# Patient Record
Sex: MALE | Race: WHITE | NOT HISPANIC OR LATINO | Employment: OTHER | URBAN - METROPOLITAN AREA
[De-identification: names, ages, dates, MRNs, and addresses within clinical notes are randomized per-mention and may not be internally consistent; named-entity substitution may affect disease eponyms.]

---

## 2017-01-25 ENCOUNTER — OFFICE VISIT - HEALTHEAST (OUTPATIENT)
Dept: FAMILY MEDICINE | Facility: CLINIC | Age: 82
End: 2017-01-25

## 2017-01-25 DIAGNOSIS — I10 HYPERTENSION: ICD-10-CM

## 2017-01-25 ASSESSMENT — MIFFLIN-ST. JEOR: SCORE: 1347.65

## 2017-02-08 ENCOUNTER — AMBULATORY - HEALTHEAST (OUTPATIENT)
Dept: NURSING | Facility: CLINIC | Age: 82
End: 2017-02-08

## 2017-09-28 ENCOUNTER — AMBULATORY - HEALTHEAST (OUTPATIENT)
Dept: NURSING | Facility: CLINIC | Age: 82
End: 2017-09-28

## 2017-09-28 DIAGNOSIS — Z23 NEEDS FLU SHOT: ICD-10-CM

## 2017-11-01 ENCOUNTER — COMMUNICATION - HEALTHEAST (OUTPATIENT)
Dept: FAMILY MEDICINE | Facility: CLINIC | Age: 82
End: 2017-11-01

## 2017-11-01 DIAGNOSIS — I10 HYPERTENSION: ICD-10-CM

## 2017-12-27 ENCOUNTER — AMBULATORY - HEALTHEAST (OUTPATIENT)
Dept: NURSING | Facility: CLINIC | Age: 82
End: 2017-12-27

## 2018-01-23 ENCOUNTER — OFFICE VISIT - HEALTHEAST (OUTPATIENT)
Dept: FAMILY MEDICINE | Facility: CLINIC | Age: 83
End: 2018-01-23

## 2018-01-23 ENCOUNTER — COMMUNICATION - HEALTHEAST (OUTPATIENT)
Dept: FAMILY MEDICINE | Facility: CLINIC | Age: 83
End: 2018-01-23

## 2018-01-23 DIAGNOSIS — I10 HYPERTENSION: ICD-10-CM

## 2018-01-23 DIAGNOSIS — R42 LIGHTHEADEDNESS: ICD-10-CM

## 2018-01-23 DIAGNOSIS — R00.2 PALPITATIONS: ICD-10-CM

## 2018-01-23 LAB
ATRIAL RATE - MUSE: 97 BPM
DIASTOLIC BLOOD PRESSURE - MUSE: NORMAL MMHG
INTERPRETATION ECG - MUSE: NORMAL
P AXIS - MUSE: 14 DEGREES
PR INTERVAL - MUSE: 192 MS
QRS DURATION - MUSE: 76 MS
QT - MUSE: 330 MS
QTC - MUSE: 419 MS
R AXIS - MUSE: 57 DEGREES
SYSTOLIC BLOOD PRESSURE - MUSE: NORMAL MMHG
T AXIS - MUSE: 36 DEGREES
VENTRICULAR RATE- MUSE: 97 BPM

## 2018-01-23 ASSESSMENT — MIFFLIN-ST. JEOR: SCORE: 1355.42

## 2018-04-04 ENCOUNTER — COMMUNICATION - HEALTHEAST (OUTPATIENT)
Dept: FAMILY MEDICINE | Facility: CLINIC | Age: 83
End: 2018-04-04

## 2018-04-04 DIAGNOSIS — I10 HYPERTENSION: ICD-10-CM

## 2018-06-12 ENCOUNTER — AMBULATORY - HEALTHEAST (OUTPATIENT)
Dept: NURSING | Facility: CLINIC | Age: 83
End: 2018-06-12

## 2018-08-30 ENCOUNTER — OFFICE VISIT - HEALTHEAST (OUTPATIENT)
Dept: FAMILY MEDICINE | Facility: CLINIC | Age: 83
End: 2018-08-30

## 2018-08-30 DIAGNOSIS — J02.0 STREP PHARYNGITIS: ICD-10-CM

## 2018-08-30 LAB — DEPRECATED S PYO AG THROAT QL EIA: ABNORMAL

## 2018-08-30 ASSESSMENT — MIFFLIN-ST. JEOR: SCORE: 1369.48

## 2018-10-17 ENCOUNTER — AMBULATORY - HEALTHEAST (OUTPATIENT)
Dept: NURSING | Facility: CLINIC | Age: 83
End: 2018-10-17

## 2018-10-17 DIAGNOSIS — Z23 FLU VACCINE NEED: ICD-10-CM

## 2019-05-14 NOTE — PROGRESS NOTES
[place] [facility]  PRIMARY CARE PROVIDER AND CLINIC:  Clayton Huynh MD, XXX RESIGNED XXX [address] [facility] / [facility] [place] [address]  Chief Complaint   Patient presents with     Hospital F/U     [place] Medical Record Number:  [id number]  Place of Service where encounter took place:  [facility] ([facility]) [[id number]]    Gabriel Esposito  is a 89 year old  (9/8/1923), admitted to the above facility from  [facility]. Hospital stay 5/06/2019 through 5/14/2019..  Admitted to this facility for  rehab, medical management and nursing care.    HPI:    HPI information obtained from: facility chart records, facility staff, patient report and Care Everywhere Epic chart review.   Brief Summary of Hospital Course:   89-year-old male with past medical history of prostatitis hospitalized for E. coli sepsis/bacteremia/UTI.  Treated with vancomycin, meropenem, Rocephin, changed to Levaquin on May 11.  Hypotension and tachycardia improved with IV fluids.  ASHVIN with electrolyte imbalance.  Magnesium replaced orally.  Metabolic and lactic acidosis improved with bicarb drip.  ASHVIN improved.  Did experience volume overload due to resuscitation for sepsis.  CT revealed small bilateral pleural effusion   And lower extremity edema which responded to IV Lasix.  Did however develop blisters on both feet from the swelling.  Updates on Status Since Skilled nursing Admission:   Patient progressing well with therapies.  Pain is challenging, particularly with dressing changes.  Appetite good.  Continues to have loose stools.  Not odiferous.  Sleeping well.  Denies chest pain, shortness of breath, lightheadedness, dizziness    CODE STATUS/ADVANCE DIRECTIVES DISCUSSION:   CPR/Full code , DNI  Patient's living condition: lives with spouse  ALLERGIES: Patient has no known allergies.  PAST MEDICAL HISTORY:  has no past medical history on file.  PAST SURGICAL HISTORY:   has no past surgical history on file.  FAMILY  HISTORY: family history is not on file.  SOCIAL HISTORY:       Post Discharge Medication Reconciliation Status: discharge medications reconciled, continue medications without change    Current Outpatient Medications   Medication Sig Dispense Refill     Acetaminophen (TYLENOL PO) Take 1,000 mg by mouth 3 times daily And 500 mg by mouth 2 times a day as needed       aspirin 81 MG EC tablet Take 81 mg by mouth daily       brimonidine (ALPHAGAN-P) 0.15 % ophthalmic solution Place 1 drop into both eyes every morning       dorzolamide-timolol PF (COSOPT) 22.3-6.8 MG/ML opthalmic solution Place 1 drop into both eyes every morning       latanoprost (XALATAN) 0.005 % ophthalmic solution Place 1 drop into both eyes At Bedtime       loperamide (IMODIUM) 2 MG capsule Take 2 mg by mouth 3 times daily as needed for diarrhea       magnesium oxide 200 MG TABS Take 400 mg by mouth 2 times daily       menthol-zinc oxide (CALMOSEPTINE) 0.44-20.6 % OINT ointment Apply to Perianal irritation topically 3 times daily as needed         ROS:  10 point ROS of systems including Constitutional, Eyes, Respiratory, Cardiovascular, Gastroenterology, Genitourinary, Integumentary, Musculoskeletal, Psychiatric were all negative except for pertinent positives noted in my HPI.    Vitals:  /63   Pulse 80   Temp 98  F (36.7  C)   Resp 20   SpO2 100%   Exam:  GENERAL APPEARANCE:  Alert, in no distress, Resting in recliner chair  ENT:  Mouth and posterior oropharynx normal, moist mucous membranes, [place]  RESP:  lungs clear to auscultation , no respiratory distress, no adventitious sounds  CV:  Palpation and auscultation of heart done , regular rate and rhythm, no murmur, rub, or gallop, significant bilateral lower extremity edema.  Wrapped with Ace wraps  ABDOMEN:  no guarding or rebound, bowel sounds normal  M/S:   Gait and station abnormal due to edema and weakness, requires use of walker  SKIN: Ruptured blisters on dorsum of foot, ankle.  See  photos            : Gupta catheter intact, draining clear, felipa urine  NEURO:   NFD  PSYCH:  normal insight, judgement and memory, affect and mood normal    Lab/Diagnostic data:  Recent labs in [facility] reviewed by me today.     ASSESSMENT/PLAN:  Sepsis due to Escherichia coli (E. coli) (H)  Physical deconditioning  BPH retention  Resolving.  Continues on Levaquin 500 mg daily through May 16.  Gupta catheter to remain in until urology visit.  Staff is scheduling visit for 7 to 10 days.  Continues to have some loose stools.  C. difficile test negative.  PRN Imodium managing.  Continue  Continue with therapies for strengthening and functionality    Localized edema  Due to volume overload.   Lymphedema therapy and wraps.  Resolving    Blisters of multiple sites  Presumed due to significant swelling from IV resuscitation per sepsis.  Resolving.  No signs or symptoms of infection or complications.  Continues with  Tylenol 500 mg 3 times daily   However this is not sufficient for pain management.  Will increase Tylenol closer to max  Wound care daily with Adaptic, ABD, Kerlix.    Chronic bilateral low back pain without sciatica  Chronic condition.  Does not interfere with therapy.  Managed with Tylenol    Hypomagnesemia  Acute kidney injury with electrolyte imbalance in hospital.  On magnesium oxide 400 mg twice daily for 3 days through May 17.  Will recheck potassium to assure stability.       transcribed by : Antonette Maldonado  Orders:  1. Increase Tylenol to 1000 mg TID and 500 mg BID PRN - Dx: Pain  2. Labs on Friday 5/17: BMP (fluid overload, diarrhea), Mag (low mag)    Total time spent with patient visit at the skilled nursing facility was 45 min including patient visit and review of past records. Greater than 50% of total time spent with counseling and coordinating care due to Admission  Electronically signed by:  GERA Whaley CNP

## 2019-05-15 ENCOUNTER — NURSING HOME VISIT (OUTPATIENT)
Dept: GERIATRICS | Facility: CLINIC | Age: 84
End: 2019-05-15
Payer: COMMERCIAL

## 2019-05-15 VITALS
OXYGEN SATURATION: 100 % | TEMPERATURE: 98 F | SYSTOLIC BLOOD PRESSURE: 125 MMHG | HEART RATE: 80 BPM | RESPIRATION RATE: 20 BRPM | DIASTOLIC BLOOD PRESSURE: 63 MMHG

## 2019-05-15 DIAGNOSIS — R60.0 LOCALIZED EDEMA: ICD-10-CM

## 2019-05-15 DIAGNOSIS — R53.81 PHYSICAL DECONDITIONING: ICD-10-CM

## 2019-05-15 DIAGNOSIS — M54.50 CHRONIC BILATERAL LOW BACK PAIN WITHOUT SCIATICA: ICD-10-CM

## 2019-05-15 DIAGNOSIS — E83.42 HYPOMAGNESEMIA: ICD-10-CM

## 2019-05-15 DIAGNOSIS — R23.8 BLISTERS OF MULTIPLE SITES: ICD-10-CM

## 2019-05-15 DIAGNOSIS — G89.29 CHRONIC BILATERAL LOW BACK PAIN WITHOUT SCIATICA: ICD-10-CM

## 2019-05-15 DIAGNOSIS — A41.51 SEPSIS DUE TO ESCHERICHIA COLI (E. COLI) (H): Primary | ICD-10-CM

## 2019-05-15 PROCEDURE — 99310 SBSQ NF CARE HIGH MDM 45: CPT | Performed by: NURSE PRACTITIONER

## 2019-05-15 RX ORDER — LOPERAMIDE HCL 2 MG
2 CAPSULE ORAL 3 TIMES DAILY PRN
COMMUNITY

## 2019-05-15 RX ORDER — BRIMONIDINE TARTRATE 1.5 MG/ML
1 SOLUTION/ DROPS OPHTHALMIC EVERY MORNING
COMMUNITY

## 2019-05-15 RX ORDER — LEVOFLOXACIN 500 MG/1
500 TABLET, FILM COATED ORAL DAILY
COMMUNITY
Start: 2019-05-15 | End: 2019-05-21

## 2019-05-15 RX ORDER — ASPIRIN 81 MG/1
81 TABLET ORAL DAILY
COMMUNITY
End: 2022-04-20

## 2019-05-15 RX ORDER — LATANOPROST 50 UG/ML
1 SOLUTION/ DROPS OPHTHALMIC AT BEDTIME
COMMUNITY

## 2019-05-15 NOTE — LETTER
5/15/2019        RE: Gabriel Esposito  [address] [facility] / [facility] [place] [address]        [place] [facility]  PRIMARY CARE PROVIDER AND CLINIC:  Clayton Huynh MD, XXX RESIGNED XXX [address] [facility] / [place] [address]  Chief Complaint   Patient presents with     Hospital F/U     [place] Medical Record Number:  [id number]  Place of Service where encounter took place:  [facility] ([facility]) [[id number]]    Gabriel Esposito  is a 89 year old  (9/8/1923), admitted to the above facility from  [facility]. Hospital stay 5/06/2019 through 5/14/2019..  Admitted to this facility for  rehab, medical management and nursing care.    HPI:    HPI information obtained from: facility chart records, facility staff, patient report and Care Everywhere Epic chart review.   Brief Summary of Hospital Course:   89-year-old male with past medical history of prostatitis hospitalized for E. coli sepsis/bacteremia/UTI.  Treated with vancomycin, meropenem, Rocephin, changed to Levaquin on May 11.  Hypotension and tachycardia improved with IV fluids.  ASHVIN with electrolyte imbalance.  Magnesium replaced orally.  Metabolic and lactic acidosis improved with bicarb drip.  ASHVIN improved.  Did experience volume overload due to resuscitation for sepsis.  CT revealed small bilateral pleural effusion   And lower extremity edema which responded to IV Lasix.  Did however develop blisters on both feet from the swelling.  Updates on Status Since Skilled nursing Admission:   Patient progressing well with therapies.  Pain is challenging, particularly with dressing changes.  Appetite good.  Continues to have loose stools.  Not odiferous.  Sleeping well.  Denies chest pain, shortness of breath, lightheadedness, dizziness    CODE STATUS/ADVANCE DIRECTIVES DISCUSSION:   CPR/Full code , DNI  Patient's living condition: lives with spouse  ALLERGIES: Patient has no known allergies.  PAST MEDICAL HISTORY:  has no past medical  history on file.  PAST SURGICAL HISTORY:   has no past surgical history on file.  FAMILY HISTORY: family history is not on file.  SOCIAL HISTORY:       Post Discharge Medication Reconciliation Status: discharge medications reconciled, continue medications without change    Current Outpatient Medications   Medication Sig Dispense Refill     Acetaminophen (TYLENOL PO) Take 1,000 mg by mouth 3 times daily And 500 mg by mouth 2 times a day as needed       aspirin 81 MG EC tablet Take 81 mg by mouth daily       brimonidine (ALPHAGAN-P) 0.15 % ophthalmic solution Place 1 drop into both eyes every morning       dorzolamide-timolol PF (COSOPT) 22.3-6.8 MG/ML opthalmic solution Place 1 drop into both eyes every morning       latanoprost (XALATAN) 0.005 % ophthalmic solution Place 1 drop into both eyes At Bedtime       loperamide (IMODIUM) 2 MG capsule Take 2 mg by mouth 3 times daily as needed for diarrhea       magnesium oxide 200 MG TABS Take 400 mg by mouth 2 times daily       menthol-zinc oxide (CALMOSEPTINE) 0.44-20.6 % OINT ointment Apply to Perianal irritation topically 3 times daily as needed         ROS:  10 point ROS of systems including Constitutional, Eyes, Respiratory, Cardiovascular, Gastroenterology, Genitourinary, Integumentary, Musculoskeletal, Psychiatric were all negative except for pertinent positives noted in my HPI.    Vitals:  /63   Pulse 80   Temp 98  F (36.7  C)   Resp 20   SpO2 100%   Exam:  GENERAL APPEARANCE:  Alert, in no distress, Resting in recliner chair  ENT:  Mouth and posterior oropharynx normal, moist mucous membranes, [place]  RESP:  lungs clear to auscultation , no respiratory distress, no adventitious sounds  CV:  Palpation and auscultation of heart done , regular rate and rhythm, no murmur, rub, or gallop, significant bilateral lower extremity edema.  Wrapped with Ace wraps  ABDOMEN:  no guarding or rebound, bowel sounds normal  M/S:   Gait and station abnormal due to edema and  weakness, requires use of walker  SKIN: Ruptured blisters on dorsum of foot, ankle.  See photos            : Gupta catheter intact, draining clear, felipa urine  NEURO:   NFD  PSYCH:  normal insight, judgement and memory, affect and mood normal    Lab/Diagnostic data:  Recent labs in [facility] reviewed by me today.     ASSESSMENT/PLAN:  Sepsis due to Escherichia coli (E. coli) (H)  Physical deconditioning  BPH retention  Resolving.  Continues on Levaquin 500 mg daily through May 16.  Gupta catheter to remain in until urology visit.  Staff is scheduling visit for 7 to 10 days.  Continues to have some loose stools.  C. difficile test negative.  PRN Imodium managing.  Continue  Continue with therapies for strengthening and functionality    Localized edema  Due to volume overload.   Lymphedema therapy and wraps.  Resolving    Blisters of multiple sites  Presumed due to significant swelling from IV resuscitation per sepsis.  Resolving.  No signs or symptoms of infection or complications.  Continues with  Tylenol 500 mg 3 times daily   However this is not sufficient for pain management.  Will increase Tylenol closer to max  Wound care daily with KOURTNEY Bautista Kerlix.    Chronic bilateral low back pain without sciatica  Chronic condition.  Does not interfere with therapy.  Managed with Tylenol    Hypomagnesemia  Acute kidney injury with electrolyte imbalance in hospital.  On magnesium oxide 400 mg twice daily for 3 days through May 17.  Will recheck potassium to assure stability.       transcribed by : Antonette Maldonado  Orders:  1. Increase Tylenol to 1000 mg TID and 500 mg BID PRN - Dx: Pain  2. Labs on Friday 5/17: BMP (fluid overload, diarrhea), Mag (low mag)    Total time spent with patient visit at the skilled nursing facility was 45 min including patient visit and review of past records. Greater than 50% of total time spent with counseling and coordinating care due to Admission  Electronically signed  by:  GERA Whaley CNP                       Sincerely,        GERA Whaley CNP

## 2019-05-16 ENCOUNTER — HOSPITAL LABORATORY (OUTPATIENT)
Facility: OTHER | Age: 84
End: 2019-05-16

## 2019-05-16 LAB
ANION GAP SERPL CALCULATED.3IONS-SCNC: 5 MMOL/L (ref 3–14)
BUN SERPL-MCNC: 20 MG/DL (ref 7–30)
CALCIUM SERPL-MCNC: 8.3 MG/DL (ref 8.5–10.1)
CHLORIDE SERPL-SCNC: 109 MMOL/L (ref 94–109)
CO2 SERPL-SCNC: 27 MMOL/L (ref 20–32)
CREAT SERPL-MCNC: 1.01 MG/DL (ref 0.66–1.25)
ERYTHROCYTE [DISTWIDTH] IN BLOOD BY AUTOMATED COUNT: 13.5 % (ref 10–15)
GFR SERPL CREATININE-BSD FRML MDRD: 63 ML/MIN/{1.73_M2}
GLUCOSE SERPL-MCNC: 114 MG/DL (ref 70–99)
HCT VFR BLD AUTO: 36 % (ref 40–53)
HGB BLD-MCNC: 11.3 G/DL (ref 13.3–17.7)
MAGNESIUM SERPL-MCNC: 2.4 MG/DL (ref 1.6–2.3)
MCH RBC QN AUTO: 30.1 PG (ref 26.5–33)
MCHC RBC AUTO-ENTMCNC: 31.4 G/DL (ref 31.5–36.5)
MCV RBC AUTO: 96 FL (ref 78–100)
PLATELET # BLD AUTO: 325 10E9/L (ref 150–450)
POTASSIUM SERPL-SCNC: 4.1 MMOL/L (ref 3.4–5.3)
RBC # BLD AUTO: 3.76 10E12/L (ref 4.4–5.9)
SODIUM SERPL-SCNC: 141 MMOL/L (ref 133–144)
WBC # BLD AUTO: 13.1 10E9/L (ref 4–11)

## 2019-05-20 ENCOUNTER — HOSPITAL LABORATORY (OUTPATIENT)
Facility: OTHER | Age: 84
End: 2019-05-20

## 2019-05-20 VITALS
TEMPERATURE: 97.9 F | SYSTOLIC BLOOD PRESSURE: 152 MMHG | OXYGEN SATURATION: 98 % | WEIGHT: 146.5 LBS | HEIGHT: 71 IN | HEART RATE: 80 BPM | BODY MASS INDEX: 20.51 KG/M2 | RESPIRATION RATE: 16 BRPM | DIASTOLIC BLOOD PRESSURE: 63 MMHG

## 2019-05-20 LAB
ERYTHROCYTE [DISTWIDTH] IN BLOOD BY AUTOMATED COUNT: 13.5 % (ref 10–15)
HCT VFR BLD AUTO: 37.4 % (ref 40–53)
HGB BLD-MCNC: 11.7 G/DL (ref 13.3–17.7)
MAGNESIUM SERPL-MCNC: 2.6 MG/DL (ref 1.6–2.3)
MCH RBC QN AUTO: 30.3 PG (ref 26.5–33)
MCHC RBC AUTO-ENTMCNC: 31.3 G/DL (ref 31.5–36.5)
MCV RBC AUTO: 97 FL (ref 78–100)
PLATELET # BLD AUTO: 372 10E9/L (ref 150–450)
RBC # BLD AUTO: 3.86 10E12/L (ref 4.4–5.9)
WBC # BLD AUTO: 8.1 10E9/L (ref 4–11)

## 2019-05-20 RX ORDER — MULTIPLE VITAMINS W/ MINERALS TAB 9MG-400MCG
1 TAB ORAL DAILY
COMMUNITY
End: 2022-04-20

## 2019-05-20 ASSESSMENT — MIFFLIN-ST. JEOR: SCORE: 1313.71

## 2019-05-21 ENCOUNTER — NURSING HOME VISIT (OUTPATIENT)
Dept: GERIATRICS | Facility: CLINIC | Age: 84
End: 2019-05-21
Payer: COMMERCIAL

## 2019-05-21 DIAGNOSIS — A41.51 SEPSIS DUE TO ESCHERICHIA COLI (E. COLI) (H): Primary | ICD-10-CM

## 2019-05-21 DIAGNOSIS — M54.50 CHRONIC BILATERAL LOW BACK PAIN WITHOUT SCIATICA: ICD-10-CM

## 2019-05-21 DIAGNOSIS — R23.8 BLISTERS OF MULTIPLE SITES: ICD-10-CM

## 2019-05-21 DIAGNOSIS — R53.81 PHYSICAL DECONDITIONING: ICD-10-CM

## 2019-05-21 DIAGNOSIS — G89.29 CHRONIC BILATERAL LOW BACK PAIN WITHOUT SCIATICA: ICD-10-CM

## 2019-05-21 PROCEDURE — 99306 1ST NF CARE HIGH MDM 50: CPT | Performed by: INTERNAL MEDICINE

## 2019-05-21 NOTE — PROGRESS NOTES
[place] [facility]  INITIAL VISIT NOTE  May 21, 2019    PRIMARY CARE PROVIDER AND CLINIC:  Hector Martin [facility] [address] [place] [address] / [facility] [address]    Chief Complaint   Patient presents with     Hospital F/U       HPI:    Gabriel Esposito is a 89 year old  (9/8/1923) male who was seen at [facility] on May 21, 2019 for an initial visit. Medical history is notable for HTN not on any medications. He was hospitalized at [facility] from 5/6/19 to 5/14/19 where he presented with abdominal pain and was found to have sepsis secondary to E Coli. Both blood and urine cultures were positive. Imaging with prostate hypertrophy hypodensities for which urology recommended outpatient follow up. A Gupta catheter was placed on 5/7/19 due to urinary retention with recommendation for trial of void at urology follow up. Hospital course complicated by iatrogenic volume overload for which he was diuresed. He developed blisters on both feet in setting of edema from volume resuscitation. He was admitted to this facility for medical management and rehab.     Today, Mr. Esposito is seen in his room. Overall is doing OK. Was able to give me a decent history of his recent hospitalization. Describes significant abdominal pain prior to going into the hospital, none now. He has a Gupta catheter in place. It does not bother him. No diarrhea or constipation. No chest pain or dyspnea. Notes his feet are getting better, but the blister on the R ankle is still painful.  He is planning to discharge to home with his spouse on Friday. Is going to change to a larger leg bag for the Gupta. Discussed with nursing and no concerns today. Working with therapies.     CODE STATUS:   CPR/Full code     ALLERGIES:   No Known Allergies    PAST MEDICAL HISTORY:   HTN, HLD, GERD    PAST SURGICAL HISTORY:   No past surgical history on file.    FAMILY HISTORY:   Reviewed and non-contributory    SOCIAL HISTORY:   Lives with spouse  "    MEDICATIONS:  Current Outpatient Medications   Medication Sig Dispense Refill     Acetaminophen (TYLENOL PO) Take 1,000 mg by mouth 3 times daily And 500 mg by mouth 2 times a day as needed       aspirin 81 MG EC tablet Take 81 mg by mouth daily       brimonidine (ALPHAGAN-P) 0.15 % ophthalmic solution Place 1 drop into both eyes every morning       dorzolamide-timolol PF (COSOPT) 22.3-6.8 MG/ML opthalmic solution Place 1 drop into both eyes every morning       latanoprost (XALATAN) 0.005 % ophthalmic solution Place 1 drop into both eyes At Bedtime       loperamide (IMODIUM) 2 MG capsule Take 2 mg by mouth 3 times daily as needed for diarrhea       menthol-zinc oxide (CALMOSEPTINE) 0.44-20.6 % OINT ointment Apply to Perianal irritation topically 3 times daily as needed       multivitamin w/minerals (MULTI-VITAMIN) tablet Take 1 tablet by mouth daily           ROS:  10 point ROS neg other than the symptoms noted above in the HPI.    PHYSICAL EXAM:  /63   Pulse 80   Temp 97.9  F (36.6  C)   Resp 16   Ht 1.791 m (5' 10.5\")   Wt 66.5 kg (146 lb 8 oz)   SpO2 98%   BMI 20.72 kg/m     Gen: sitting in recliner, alert, cooperative and in no acute distress  HEENT: normocephalic; oropharynx clear  Card: RRR, S1, S2, no murmurs  Resp: lungs clear to auscultation bilaterally, no crackles or wheezes  GI: abdomen soft, not-tender  : Gupta in place draining clear yellow urine  MSK: normal muscle tone, no LE edema  Neuro: CX II-XII grossly in tact; ROM in all four extremities grossly in tact  Psych: alert and oriented x3; normal affect  Skin: R foot with healing area from deroofed blisters on the dorsum of both feet foot and medial aspect of both ankles    LABORATORY/IMAGING DATA:  Reviewed as per Epic    ASSESSMENT/PLAN:    E Coli Sepsis  Secondary to urinary source. Blood and urine cultures positive. Completed a course of antibiotics on 5/16/19. Afebrile at TCU. Gupta in place.   -- follow clinically    Urinary " Retention  Gupta placed on 5/7/19 with plan for trial of void at urology follow up.   -- routine Gupta cares  -- follow up with urology as scheduled on 6/11/19  -- will have home RN upon discharge    Prostate Hypertrophy & Hypodensities  Seen on imaging. Urology recommended outpatient follow up for possible prostate ultrasound.   -- follow up with urology as scheduled on 6/11/19    Bilateral LE Blisters  Iatrogenic from edema from volume resuscitation in setting of sepsis. No signs of infection. They appear to be healing.   -- wound cares as ordered: cleanse with saline and pat dry. Apply adaptic to open blisters  -- continues on APAP 1000 mg TID  -- will have home RN upon discharge    Chronic Low Back Pain  Not bothersome today  -- continues on APAP 1000 mg TID    HTN  By history. Not on any anti-HTN medications. SBPs labile in 120s-160s, most 120s-130s.  -- follow clinically     Physical Deconditioning  In setting of hospitalization and underlying medical conditions  -- ongoing PT/OT      FGS TIME SPENT: Total time spent with patient visit at the skilled nursing facility was >45 min including patient visit, review of past records and discussion with nursing and NP. Greater than 50% of total time spent with counseling and coordinating care due to recent hosptialization for sepsis complicated by iatrogenic volume overload with LE blisters requiring wound cares, urinary retention with Gupta in place, discharge planning    Electronically signed by:  Rena Santamaria MD        Documentation of Face to Face and Certification for Home Health Services    I certify that patientGabriel is under my care and that I, or a Nurse Practitioner or Physician's Assistant working with me, had a face-to-face encounter that meets the physician face-to-face encounter requirements with this patient on: 5/21/2019.    This encounter with the patient was in whole, or in part, for the following medical condition, which is the primary  reason for Home Health Care: E Coli Sepsis, bilateral LE blisters with ongoing wound cares, urinary retention with Gupta catheter in place, physical deconditioning     I certify that, based on my findings, the following services are medically necessary Home Health Services: Nursing, Occupational Therapy, Physical Therapy and HHA    My clinical findings support the need for the above services because: Nurse is needed: wound cares, Gupta teaching, cares., Occupational Therapy Services are needed to assess and treat cognitive ability and address ADL safety due to impairment in ongoing strength, balance, endurance, ADLS and Physical Therapy Services are needed to assess and treat the following functional impairments: ongoing strength, balance, endurance.    Further, I certify that my clinical findings support that this patient is homebound (i.e. absences from home require considerable and taxing effort and are for medical reasons or [employer] services or infrequently or of short duration when for other reasons) because: unable to leave home without assistance    Based on the above findings, I certify that this patient is confined to the home and needs intermittent skilled nursing care, physical therapy and/or speech therapy.  The patient is under my care, and I have initiated the establishment of the plan of care.  This patient will be followed by a physician who will periodically review the plan of care.    Physician/Provider to provide follow up care: Hector Martin    [facility] certified Physician at time of discharge: Rena Santamaria MD  Electronically Signed by: Rena Santamaria MD

## 2019-05-21 NOTE — LETTER
5/21/2019        RE: Gabriel Esposito  [address] [facility] [address]        [facility]  INITIAL VISIT NOTE  May 21, 2019    PRIMARY CARE PROVIDER AND CLINIC:  [facility] [address] [facility] [address] / [facility] [address]    Chief Complaint   Patient presents with     Hospital F/U       HPI:    Gabriel Esposito is a 89 year old  (9/8/1923) male who was seen at [facility] [place] [facility] on May 21, 2019 for an initial visit. Medical history is notable for HTN not on any medications. He was hospitalized at [facility] from 5/6/19 to 5/14/19 where he presented with abdominal pain and was found to have sepsis secondary to E Coli. Both blood and urine cultures were positive. Imaging with prostate hypertrophy hypodensities for which urology recommended outpatient follow up. A Gupta catheter was placed on 5/7/19 due to urinary retention with recommendation for trial of void at urology follow up. Hospital course complicated by iatrogenic volume overload for which he was diuresed. He developed blisters on both feet in setting of edema from volume resuscitation. He was admitted to this facility for medical management and rehab.     Today, Mr. Esposito is seen in his room. Overall is doing OK. Was able to give me a decent history of his recent hospitalization. Describes significant abdominal pain prior to going into the hospital, none now. He has a Gupta catheter in place. It does not bother him. No diarrhea or constipation. No chest pain or dyspnea. Notes his feet are getting better, but the blister on the R ankle is still painful.  He is planning to discharge to home with his spouse on Friday. Is going to change to a larger leg bag for the Gupta. Discussed with nursing and no concerns today. Working with therapies.     CODE STATUS:   CPR/Full code     ALLERGIES:   No Known Allergies    PAST MEDICAL HISTORY:   HTN, HLD, GERD    PAST SURGICAL HISTORY:   No past surgical history on  "file.    FAMILY HISTORY:   Reviewed and non-contributory    SOCIAL HISTORY:   Lives with spouse     MEDICATIONS:  Current Outpatient Medications   Medication Sig Dispense Refill     Acetaminophen (TYLENOL PO) Take 1,000 mg by mouth 3 times daily And 500 mg by mouth 2 times a day as needed       aspirin 81 MG EC tablet Take 81 mg by mouth daily       brimonidine (ALPHAGAN-P) 0.15 % ophthalmic solution Place 1 drop into both eyes every morning       dorzolamide-timolol PF (COSOPT) 22.3-6.8 MG/ML opthalmic solution Place 1 drop into both eyes every morning       latanoprost (XALATAN) 0.005 % ophthalmic solution Place 1 drop into both eyes At Bedtime       loperamide (IMODIUM) 2 MG capsule Take 2 mg by mouth 3 times daily as needed for diarrhea       menthol-zinc oxide (CALMOSEPTINE) 0.44-20.6 % OINT ointment Apply to Perianal irritation topically 3 times daily as needed       multivitamin w/minerals (MULTI-VITAMIN) tablet Take 1 tablet by mouth daily           ROS:  10 point ROS neg other than the symptoms noted above in the HPI.    PHYSICAL EXAM:  /63   Pulse 80   Temp 97.9  F (36.6  C)   Resp 16   Ht 1.791 m (5' 10.5\")   Wt 66.5 kg (146 lb 8 oz)   SpO2 98%   BMI 20.72 kg/m      Gen: sitting in recliner, alert, cooperative and in no acute distress  HEENT: normocephalic; oropharynx clear  Card: RRR, S1, S2, no murmurs  Resp: lungs clear to auscultation bilaterally, no crackles or wheezes  GI: abdomen soft, not-tender  : Gupta in place draining clear yellow urine  MSK: normal muscle tone, no LE edema  Neuro: CX II-XII grossly in tact; ROM in all four extremities grossly in tact  Psych: alert and oriented x3; normal affect  Skin: R foot with healing area from deroofed blisters on the dorsum of both feet foot and medial aspect of both ankles    LABORATORY/IMAGING DATA:  Reviewed as per Epic    ASSESSMENT/PLAN:    E Coli Sepsis  Secondary to urinary source. Blood and urine cultures positive. Completed a " course of antibiotics on 5/16/19. Afebrile at TCU. Gupta in place.   -- follow clinically    Urinary Retention  Gupta placed on 5/7/19 with plan for trial of void at urology follow up.   -- routine Gupta cares  -- follow up with urology as scheduled on 6/11/19  -- will have home RN upon discharge    Prostate Hypertrophy & Hypodensities  Seen on imaging. Urology recommended outpatient follow up for possible prostate ultrasound.   -- follow up with urology as scheduled on 6/11/19    Bilateral LE Blisters  Iatrogenic from edema from volume resuscitation in setting of sepsis. No signs of infection. They appear to be healing.   -- wound cares as ordered: cleanse with saline and pat dry. Apply adaptic to open blisters  -- continues on APAP 1000 mg TID  -- will have home RN upon discharge    Chronic Low Back Pain  Not bothersome today  -- continues on APAP 1000 mg TID    HTN  By history. Not on any anti-HTN medications. SBPs labile in 120s-160s, most 120s-130s.  -- follow clinically     Physical Deconditioning  In setting of hospitalization and underlying medical conditions  -- ongoing PT/OT      FGS TIME SPENT: Total time spent with patient visit at the skilled nursing facility was >45 min including patient visit, review of past records and discussion with nursing and NP. Greater than 50% of total time spent with counseling and coordinating care due to recent hosptialization for sepsis complicated by iatrogenic volume overload with LE blisters requiring wound cares, urinary retention with Gupta in place, discharge planning    Electronically signed by:  Rena Santamaria MD        Documentation of Face to Face and Certification for Home Health Services    I certify that patientGabriel is under my care and that I, or a Nurse Practitioner or Physician's Assistant working with me, had a face-to-face encounter that meets the physician face-to-face encounter requirements with this patient on: 5/21/2019.    This encounter  with the patient was in whole, or in part, for the following medical condition, which is the primary reason for Home Health Care: E Coli Sepsis, bilateral LE blisters with ongoing wound cares, urinary retention with Gupta catheter in place, physical deconditioning     I certify that, based on my findings, the following services are medically necessary Home Health Services: Nursing, Occupational Therapy, Physical Therapy and HHA    My clinical findings support the need for the above services because: Nurse is needed: wound cares, Gupta teaching, cares., Occupational Therapy Services are needed to assess and treat cognitive ability and address ADL safety due to impairment in ongoing strength, balance, endurance, ADLS and Physical Therapy Services are needed to assess and treat the following functional impairments: ongoing strength, balance, endurance.    Further, I certify that my clinical findings support that this patient is homebound (i.e. absences from home require considerable and taxing effort and are for medical reasons or [employer] services or infrequently or of short duration when for other reasons) because: unable to leave home without assistance    Based on the above findings, I certify that this patient is confined to the home and needs intermittent skilled nursing care, physical therapy and/or speech therapy.  The patient is under my care, and I have initiated the establishment of the plan of care.  This patient will be followed by a physician who will periodically review the plan of care.    Physician/Provider to provide follow up care: Hector Martin certified Physician at time of discharge: Rena Santamaria MD  Electronically Signed by: Rena Santamaria MD                  Sincerely,        Rena Santamaria MD

## 2019-06-04 ENCOUNTER — RECORDS - HEALTHEAST (OUTPATIENT)
Dept: ADMINISTRATIVE | Facility: OTHER | Age: 84
End: 2019-06-04

## 2019-06-05 ENCOUNTER — RECORDS - HEALTHEAST (OUTPATIENT)
Dept: ADMINISTRATIVE | Facility: OTHER | Age: 84
End: 2019-06-05

## 2019-06-10 ENCOUNTER — TELEPHONE (OUTPATIENT)
Dept: UROLOGY | Facility: CLINIC | Age: 84
End: 2019-06-10

## 2019-06-10 NOTE — TELEPHONE ENCOUNTER
Advised we have no Urologist in clinic until that time.  offered could consult with their PCP care system to find another Urologist within their care system if wished.    Pt opted to stay with the current appt.  Arianna Menjivar RN  [facility] - [place], [place]

## 2019-06-10 NOTE — TELEPHONE ENCOUNTER
Reason for Call:  Other     Detailed comments: Pt's wife, Anali, calling (no consent to communicate on file): Pt was scheduled for catheter removal on 06/11 - rescheduled to 06/19. Pt has had catheter in since 05/05. Pt's wife is concerned at how long the catheter will be in - Please advise    Phone Number Patient can be reached at: Home number on file [phone] (home)    Best Time: Any    Can we leave a detailed message on this number? YES    Call taken on 6/10/2019 at 11:30 AM by Denise Behrendt

## 2019-06-12 NOTE — TELEPHONE ENCOUNTER
Called ad advised Dr Metcalf is booked further out than Dr Billy and no earlier appts are available.  Pt states she does have family and stated they will drive him to wherever he needs to go.  Reviewed Urology offices available on  website.  Pt opted [facility], number given for that office.    Arianna Menjivar RN  [facility] [place]

## 2019-06-12 NOTE — TELEPHONE ENCOUNTER
Pt wife calling stating she would like to talk to the RN again. She states she spoke to Elisha on [place] and they suggested pt try to get in to see Dr. Metcalf if Dr. Billy is out of the office.   She is very concerned about pt having cath in for another couple weeks.     Please call    Laury Guerra  Specialty CSS

## 2019-06-19 ENCOUNTER — OFFICE VISIT (OUTPATIENT)
Dept: UROLOGY | Facility: CLINIC | Age: 84
End: 2019-06-19
Payer: COMMERCIAL

## 2019-06-19 VITALS — DIASTOLIC BLOOD PRESSURE: 71 MMHG | SYSTOLIC BLOOD PRESSURE: 164 MMHG | HEART RATE: 66 BPM | RESPIRATION RATE: 16 BRPM

## 2019-06-19 DIAGNOSIS — R33.9 URINARY RETENTION: Primary | ICD-10-CM

## 2019-06-19 PROCEDURE — 51702 INSERT TEMP BLADDER CATH: CPT | Performed by: UROLOGY

## 2019-06-19 NOTE — NURSING NOTE
"Initial /71 (BP Location: Right arm, Patient Position: Chair, Cuff Size: Adult Regular)   Pulse 66   Resp 16  Estimated body mass index is 20.72 kg/m  as calculated from the following:    Height as of 5/20/19: 1.791 m (5' 10.5\").    Weight as of 5/20/19: 66.5 kg (146 lb 8 oz). .    Possible  TRIAL OF VOID.  cristino dey LPN    "

## 2019-06-19 NOTE — NURSING NOTE
Gabriel Esposito was brought back to the procedure room for a trail void per Dr. billy. 150 cc of sterile water was instilled into his bladder. 10 cc fluid was removed from his catheter balloon and a 16 Liberian golden catheter was removed. The patient was unable to urinate.  Per Dr Billy patient was instructed how to do cic.Patient taught to self catheterize per Dr. billy. Patient demonstrated the procedure well. Patient was taught with a 14 fr. Catheter. Supplies given to the patient.cristino dey LPN

## 2019-07-15 NOTE — PROGRESS NOTES
Gabreil Esposito was brought back to the procedure room for a trail void per Dr. marcial. 150 cc of sterile water was instilled into his bladder. 10 cc fluid was removed from his catheter balloon and a 16 [place]uran golden catheter was removed. The patient was unable to urinate.  Per Dr Marcial patient was instructed how to do cic.Patient taught to self catheterize per Dr. marcial. Patient demonstrated the procedure well. Patient was taught with a 14 fr. Catheter. Supplies given to the patient.

## 2019-07-18 ENCOUNTER — RECORDS - HEALTHEAST (OUTPATIENT)
Dept: ADMINISTRATIVE | Facility: OTHER | Age: 84
End: 2019-07-18

## 2019-07-22 ENCOUNTER — COMMUNICATION - HEALTHEAST (OUTPATIENT)
Dept: FAMILY MEDICINE | Facility: CLINIC | Age: 84
End: 2019-07-22

## 2019-07-26 ENCOUNTER — OFFICE VISIT - HEALTHEAST (OUTPATIENT)
Dept: FAMILY MEDICINE | Facility: CLINIC | Age: 84
End: 2019-07-26

## 2019-07-26 DIAGNOSIS — E78.49 OTHER HYPERLIPIDEMIA: ICD-10-CM

## 2019-07-26 DIAGNOSIS — H40.9 GLAUCOMA, UNSPECIFIED GLAUCOMA TYPE, UNSPECIFIED LATERALITY: ICD-10-CM

## 2019-07-26 DIAGNOSIS — I10 ESSENTIAL HYPERTENSION: ICD-10-CM

## 2019-09-26 ENCOUNTER — AMBULATORY - HEALTHEAST (OUTPATIENT)
Dept: NURSING | Facility: CLINIC | Age: 84
End: 2019-09-26

## 2020-08-14 ENCOUNTER — RECORDS - HEALTHEAST (OUTPATIENT)
Dept: ADMINISTRATIVE | Facility: OTHER | Age: 85
End: 2020-08-14

## 2020-09-09 ENCOUNTER — OFFICE VISIT - HEALTHEAST (OUTPATIENT)
Dept: FAMILY MEDICINE | Facility: CLINIC | Age: 85
End: 2020-09-09

## 2020-09-09 DIAGNOSIS — T83.511A URINARY TRACT INFECTION ASSOCIATED WITH CATHETERIZATION OF URINARY TRACT, UNSPECIFIED INDWELLING URINARY CATHETER TYPE, INITIAL ENCOUNTER (H): ICD-10-CM

## 2020-09-09 DIAGNOSIS — N39.0 URINARY TRACT INFECTION ASSOCIATED WITH CATHETERIZATION OF URINARY TRACT, UNSPECIFIED INDWELLING URINARY CATHETER TYPE, INITIAL ENCOUNTER (H): ICD-10-CM

## 2020-09-09 LAB
ALBUMIN UR-MCNC: ABNORMAL MG/DL
APPEARANCE UR: ABNORMAL
BACTERIA #/AREA URNS HPF: ABNORMAL HPF
BILIRUB UR QL STRIP: NEGATIVE
COLOR UR AUTO: YELLOW
GLUCOSE UR STRIP-MCNC: NEGATIVE MG/DL
HGB UR QL STRIP: ABNORMAL
KETONES UR STRIP-MCNC: NEGATIVE MG/DL
LEUKOCYTE ESTERASE UR QL STRIP: ABNORMAL
NITRATE UR QL: POSITIVE
PH UR STRIP: 5 [PH] (ref 5–8)
RBC #/AREA URNS AUTO: ABNORMAL HPF
SP GR UR STRIP: 1.02 (ref 1–1.03)
SQUAMOUS #/AREA URNS AUTO: ABNORMAL LPF
UROBILINOGEN UR STRIP-ACNC: ABNORMAL
WBC #/AREA URNS AUTO: >100 HPF
WBC CLUMPS #/AREA URNS HPF: PRESENT /[HPF]

## 2020-09-11 LAB — BACTERIA SPEC CULT: ABNORMAL

## 2020-09-14 ENCOUNTER — RECORDS - HEALTHEAST (OUTPATIENT)
Dept: ADMINISTRATIVE | Facility: OTHER | Age: 85
End: 2020-09-14

## 2020-09-18 ENCOUNTER — OFFICE VISIT - HEALTHEAST (OUTPATIENT)
Dept: FAMILY MEDICINE | Facility: CLINIC | Age: 85
End: 2020-09-18

## 2020-09-18 DIAGNOSIS — I10 BENIGN ESSENTIAL HYPERTENSION: ICD-10-CM

## 2020-09-18 DIAGNOSIS — N30.01 ACUTE CYSTITIS WITH HEMATURIA: ICD-10-CM

## 2020-09-18 LAB
ALBUMIN UR-MCNC: ABNORMAL MG/DL
APPEARANCE UR: ABNORMAL
BACTERIA #/AREA URNS HPF: ABNORMAL HPF
BILIRUB UR QL STRIP: NEGATIVE
COLOR UR AUTO: ABNORMAL
GLUCOSE UR STRIP-MCNC: NEGATIVE MG/DL
HGB UR QL STRIP: ABNORMAL
KETONES UR STRIP-MCNC: NEGATIVE MG/DL
LEUKOCYTE ESTERASE UR QL STRIP: ABNORMAL
MUCOUS THREADS #/AREA URNS LPF: ABNORMAL LPF
NITRATE UR QL: NEGATIVE
PH UR STRIP: 5 [PH] (ref 5–8)
RBC #/AREA URNS AUTO: >100 HPF
SP GR UR STRIP: 1.02 (ref 1–1.03)
SQUAMOUS #/AREA URNS AUTO: ABNORMAL LPF
UROBILINOGEN UR STRIP-ACNC: ABNORMAL
WBC #/AREA URNS AUTO: ABNORMAL HPF

## 2020-09-19 LAB — BACTERIA SPEC CULT: NO GROWTH

## 2020-09-25 ENCOUNTER — OFFICE VISIT - HEALTHEAST (OUTPATIENT)
Dept: FAMILY MEDICINE | Facility: CLINIC | Age: 85
End: 2020-09-25

## 2020-09-25 ENCOUNTER — AMBULATORY - HEALTHEAST (OUTPATIENT)
Dept: SURGERY | Facility: HOSPITAL | Age: 85
End: 2020-09-25

## 2020-09-25 ENCOUNTER — COMMUNICATION - HEALTHEAST (OUTPATIENT)
Dept: FAMILY MEDICINE | Facility: CLINIC | Age: 85
End: 2020-09-25

## 2020-09-25 DIAGNOSIS — N02.9 RECURRENT AND PERSISTENT HEMATURIA: ICD-10-CM

## 2020-09-25 DIAGNOSIS — Z11.59 ENCOUNTER FOR SCREENING FOR OTHER VIRAL DISEASES: ICD-10-CM

## 2020-09-25 DIAGNOSIS — N39.0 URINARY TRACT INFECTION WITH HEMATURIA, SITE UNSPECIFIED: ICD-10-CM

## 2020-09-25 DIAGNOSIS — R31.9 URINARY TRACT INFECTION WITH HEMATURIA, SITE UNSPECIFIED: ICD-10-CM

## 2020-09-25 DIAGNOSIS — Z87.448 HISTORY OF HEMATURIA: ICD-10-CM

## 2020-09-25 LAB
ALBUMIN UR-MCNC: ABNORMAL MG/DL
APPEARANCE UR: CLEAR
BACTERIA #/AREA URNS HPF: ABNORMAL HPF
BILIRUB UR QL STRIP: NEGATIVE
COLOR UR AUTO: YELLOW
GLUCOSE UR STRIP-MCNC: NEGATIVE MG/DL
HGB UR QL STRIP: ABNORMAL
KETONES UR STRIP-MCNC: NEGATIVE MG/DL
LEUKOCYTE ESTERASE UR QL STRIP: ABNORMAL
NITRATE UR QL: NEGATIVE
PH UR STRIP: 5 [PH] (ref 5–8)
RBC #/AREA URNS AUTO: ABNORMAL HPF
SP GR UR STRIP: 1.02 (ref 1–1.03)
SQUAMOUS #/AREA URNS AUTO: ABNORMAL LPF
UROBILINOGEN UR STRIP-ACNC: ABNORMAL
WBC #/AREA URNS AUTO: ABNORMAL HPF

## 2020-09-26 LAB — BACTERIA SPEC CULT: NO GROWTH

## 2020-09-28 ENCOUNTER — OFFICE VISIT - HEALTHEAST (OUTPATIENT)
Dept: FAMILY MEDICINE | Facility: CLINIC | Age: 85
End: 2020-09-28

## 2020-09-28 DIAGNOSIS — Z01.818 PREOPERATIVE EXAMINATION: ICD-10-CM

## 2020-09-28 DIAGNOSIS — R03.0 ELEVATED BLOOD PRESSURE READING: ICD-10-CM

## 2020-09-28 DIAGNOSIS — H40.9 GLAUCOMA OF BOTH EYES, UNSPECIFIED GLAUCOMA TYPE: ICD-10-CM

## 2020-09-28 DIAGNOSIS — N39.0 RECURRENT UTI: ICD-10-CM

## 2020-09-28 LAB
ANION GAP SERPL CALCULATED.3IONS-SCNC: 12 MMOL/L (ref 5–18)
BUN SERPL-MCNC: 16 MG/DL (ref 8–28)
CALCIUM SERPL-MCNC: 9.2 MG/DL (ref 8.5–10.5)
CHLORIDE BLD-SCNC: 104 MMOL/L (ref 98–107)
CO2 SERPL-SCNC: 25 MMOL/L (ref 22–31)
CREAT SERPL-MCNC: 1.18 MG/DL (ref 0.7–1.3)
GFR SERPL CREATININE-BSD FRML MDRD: 57 ML/MIN/1.73M2
GLUCOSE BLD-MCNC: 119 MG/DL (ref 70–125)
HGB BLD-MCNC: 14.7 G/DL (ref 14–18)
POTASSIUM BLD-SCNC: 4 MMOL/L (ref 3.5–5)
SODIUM SERPL-SCNC: 141 MMOL/L (ref 136–145)

## 2020-09-28 ASSESSMENT — MIFFLIN-ST. JEOR: SCORE: 1315.05

## 2020-09-29 ENCOUNTER — SURGERY - HEALTHEAST (OUTPATIENT)
Dept: SURGERY | Facility: HOSPITAL | Age: 85
End: 2020-09-29

## 2020-09-29 ENCOUNTER — COMMUNICATION - HEALTHEAST (OUTPATIENT)
Dept: FAMILY MEDICINE | Facility: CLINIC | Age: 85
End: 2020-09-29

## 2020-09-29 ENCOUNTER — ANESTHESIA - HEALTHEAST (OUTPATIENT)
Dept: SURGERY | Facility: HOSPITAL | Age: 85
End: 2020-09-29

## 2020-09-29 ASSESSMENT — MIFFLIN-ST. JEOR: SCORE: 1306.66

## 2020-09-30 ASSESSMENT — MIFFLIN-ST. JEOR: SCORE: 1313.35

## 2020-10-05 ENCOUNTER — COMMUNICATION - HEALTHEAST (OUTPATIENT)
Dept: SCHEDULING | Facility: CLINIC | Age: 85
End: 2020-10-05

## 2020-10-08 ENCOUNTER — RECORDS - HEALTHEAST (OUTPATIENT)
Dept: ADMINISTRATIVE | Facility: OTHER | Age: 85
End: 2020-10-08

## 2020-11-07 ENCOUNTER — APPOINTMENT (OUTPATIENT)
Dept: CT IMAGING | Facility: CLINIC | Age: 85
End: 2020-11-07
Attending: EMERGENCY MEDICINE
Payer: COMMERCIAL

## 2020-11-07 ENCOUNTER — HOSPITAL ENCOUNTER (EMERGENCY)
Facility: CLINIC | Age: 85
Discharge: HOME | End: 2020-11-07
Attending: EMERGENCY MEDICINE | Admitting: EMERGENCY MEDICINE
Payer: COMMERCIAL

## 2020-11-07 VITALS
HEART RATE: 59 BPM | WEIGHT: 160 LBS | BODY MASS INDEX: 22.4 KG/M2 | TEMPERATURE: 97.5 F | HEIGHT: 71 IN | OXYGEN SATURATION: 96 % | RESPIRATION RATE: 18 BRPM | SYSTOLIC BLOOD PRESSURE: 179 MMHG | DIASTOLIC BLOOD PRESSURE: 71 MMHG

## 2020-11-07 DIAGNOSIS — N39.0 ACUTE URINARY TRACT INFECTION: ICD-10-CM

## 2020-11-07 DIAGNOSIS — R31.9 HEMATURIA, UNSPECIFIED TYPE: ICD-10-CM

## 2020-11-07 DIAGNOSIS — C67.9 MALIGNANT NEOPLASM OF URINARY BLADDER, UNSPECIFIED SITE (H): ICD-10-CM

## 2020-11-07 DIAGNOSIS — I15.9 SECONDARY HYPERTENSION: ICD-10-CM

## 2020-11-07 DIAGNOSIS — R33.9 URINARY RETENTION: ICD-10-CM

## 2020-11-07 LAB
ALBUMIN UR-MCNC: 100 MG/DL
ANION GAP SERPL CALCULATED.3IONS-SCNC: 4 MMOL/L (ref 3–14)
APPEARANCE UR: ABNORMAL
APTT PPP: 32 SEC (ref 22–37)
BACTERIA #/AREA URNS HPF: ABNORMAL /HPF
BASOPHILS # BLD AUTO: 0 10E9/L (ref 0–0.2)
BASOPHILS NFR BLD AUTO: 0.5 %
BILIRUB UR QL STRIP: NEGATIVE
BUN SERPL-MCNC: 17 MG/DL (ref 7–30)
CALCIUM SERPL-MCNC: 9 MG/DL (ref 8.5–10.1)
CHLORIDE SERPL-SCNC: 110 MMOL/L (ref 94–109)
CO2 SERPL-SCNC: 28 MMOL/L (ref 20–32)
COLOR UR AUTO: YELLOW
CREAT SERPL-MCNC: 1.03 MG/DL (ref 0.66–1.25)
DIFFERENTIAL METHOD BLD: NORMAL
EOSINOPHIL # BLD AUTO: 0 10E9/L (ref 0–0.7)
EOSINOPHIL NFR BLD AUTO: 0.4 %
ERYTHROCYTE [DISTWIDTH] IN BLOOD BY AUTOMATED COUNT: 13.4 % (ref 10–15)
GFR SERPL CREATININE-BSD FRML MDRD: 61 ML/MIN/{1.73_M2}
GLUCOSE SERPL-MCNC: 100 MG/DL (ref 70–99)
GLUCOSE UR STRIP-MCNC: NEGATIVE MG/DL
HCT VFR BLD AUTO: 42.7 % (ref 40–53)
HGB BLD-MCNC: 13.9 G/DL (ref 13.3–17.7)
HGB UR QL STRIP: ABNORMAL
IMM GRANULOCYTES # BLD: 0 10E9/L (ref 0–0.4)
IMM GRANULOCYTES NFR BLD: 0.1 %
INR PPP: 1.25 (ref 0.86–1.14)
KETONES UR STRIP-MCNC: NEGATIVE MG/DL
LEUKOCYTE ESTERASE UR QL STRIP: ABNORMAL
LYMPHOCYTES # BLD AUTO: 2 10E9/L (ref 0.8–5.3)
LYMPHOCYTES NFR BLD AUTO: 25.2 %
MCH RBC QN AUTO: 31.1 PG (ref 26.5–33)
MCHC RBC AUTO-ENTMCNC: 32.6 G/DL (ref 31.5–36.5)
MCV RBC AUTO: 96 FL (ref 78–100)
MONOCYTES # BLD AUTO: 0.8 10E9/L (ref 0–1.3)
MONOCYTES NFR BLD AUTO: 9.3 %
MUCOUS THREADS #/AREA URNS LPF: PRESENT /LPF
NEUTROPHILS # BLD AUTO: 5.2 10E9/L (ref 1.6–8.3)
NEUTROPHILS NFR BLD AUTO: 64.5 %
NITRATE UR QL: NEGATIVE
NRBC # BLD AUTO: 0 10*3/UL
NRBC BLD AUTO-RTO: 0 /100
PH UR STRIP: 5 PH (ref 5–7)
PLATELET # BLD AUTO: 161 10E9/L (ref 150–450)
POTASSIUM SERPL-SCNC: 3.8 MMOL/L (ref 3.4–5.3)
RBC # BLD AUTO: 4.47 10E12/L (ref 4.4–5.9)
RBC #/AREA URNS AUTO: >182 /HPF (ref 0–2)
SODIUM SERPL-SCNC: 142 MMOL/L (ref 133–144)
SOURCE: ABNORMAL
SP GR UR STRIP: 1.01 (ref 1–1.03)
UROBILINOGEN UR STRIP-MCNC: 0 MG/DL (ref 0–2)
WBC # BLD AUTO: 8.1 10E9/L (ref 4–11)
WBC #/AREA URNS AUTO: >182 /HPF (ref 0–5)

## 2020-11-07 PROCEDURE — 51701 INSERT BLADDER CATHETER: CPT | Performed by: EMERGENCY MEDICINE

## 2020-11-07 PROCEDURE — 99285 EMERGENCY DEPT VISIT HI MDM: CPT | Mod: 25 | Performed by: EMERGENCY MEDICINE

## 2020-11-07 PROCEDURE — 85730 THROMBOPLASTIN TIME PARTIAL: CPT | Performed by: EMERGENCY MEDICINE

## 2020-11-07 PROCEDURE — 51798 US URINE CAPACITY MEASURE: CPT | Performed by: EMERGENCY MEDICINE

## 2020-11-07 PROCEDURE — 85025 COMPLETE CBC W/AUTO DIFF WBC: CPT | Performed by: EMERGENCY MEDICINE

## 2020-11-07 PROCEDURE — 99285 EMERGENCY DEPT VISIT HI MDM: CPT | Performed by: EMERGENCY MEDICINE

## 2020-11-07 PROCEDURE — 87086 URINE CULTURE/COLONY COUNT: CPT | Performed by: EMERGENCY MEDICINE

## 2020-11-07 PROCEDURE — 250N000011 HC RX IP 250 OP 636: Performed by: EMERGENCY MEDICINE

## 2020-11-07 PROCEDURE — 74176 CT ABD & PELVIS W/O CONTRAST: CPT

## 2020-11-07 PROCEDURE — 96365 THER/PROPH/DIAG IV INF INIT: CPT | Performed by: EMERGENCY MEDICINE

## 2020-11-07 PROCEDURE — 85610 PROTHROMBIN TIME: CPT | Performed by: EMERGENCY MEDICINE

## 2020-11-07 PROCEDURE — 81001 URINALYSIS AUTO W/SCOPE: CPT | Performed by: FAMILY MEDICINE

## 2020-11-07 PROCEDURE — 80048 BASIC METABOLIC PNL TOTAL CA: CPT | Performed by: EMERGENCY MEDICINE

## 2020-11-07 RX ORDER — CEPHALEXIN 500 MG/1
500 CAPSULE ORAL 3 TIMES DAILY
Qty: 15 CAPSULE | Refills: 0 | Status: SHIPPED | OUTPATIENT
Start: 2020-11-07 | End: 2020-11-12

## 2020-11-07 RX ORDER — CEFTRIAXONE SODIUM 1 G/50ML
1 INJECTION, SOLUTION INTRAVENOUS ONCE
Status: COMPLETED | OUTPATIENT
Start: 2020-11-07 | End: 2020-11-07

## 2020-11-07 RX ADMIN — CEFTRIAXONE SODIUM 1 G: 1 INJECTION, SOLUTION INTRAVENOUS at 17:53

## 2020-11-07 ASSESSMENT — MIFFLIN-ST. JEOR: SCORE: 1372.89

## 2020-11-07 NOTE — ED AVS SNAPSHOT
[facility] Emergency Dept  [address] [address]  Phone: [phone]  Fax: [phone]                                    Gabriel Esposito   MRN: [id number]    Department: [facility] Emergency Dept   Date of Visit: 11/7/2020           After Visit Summary Signature Page    I have received my discharge instructions, and my questions have been answered. I have discussed any challenges I see with this plan with the nurse or doctor.    ..........................................................................................................................................  Patient/Patient Representative Signature      ..........................................................................................................................................  Patient Representative Print Name and Relationship to Patient    ..................................................               ................................................  Date                                   Time    ..........................................................................................................................................  Reviewed by Signature/Title    ...................................................              ..............................................  Date                                               Time          22EPIC Rev 08/18

## 2020-11-07 NOTE — ED NOTES
Pt presents to the ED for complaint of hematuria intermittently since yesterday. Pt has a recent hx of UTIs. He had tumor on his bladder which was removed this fall. He had a catheter at the time. He states that when it was removed, they forgot to deflate the balloon and he has had UTI and voiding issues since. He does admit to some dysuria.

## 2020-11-07 NOTE — ED PROVIDER NOTES
History     Chief Complaint   Patient presents with     Hematuria     blood in urine today     HOLDEN Esposito is a 89 year old male with past medical history significant for hypertension, hyperlipidemia, GERD, bladder tumor with recent cystoscopy with tumor removal at the end of September who presents the emergency department complaining of hematuria.  Patient states he had some issues with a Gupta catheter after surgery and then developed an infection and could not go through the bladder treatment for the cancer yet.  He states today he woke up when he peed large amount of blood came out and then it cleared.  He has not had significant blood since that time.  He has had some mild bladder spasms but otherwise denies any fevers chills nausea vomiting diarrhea, chest pain, shortness of breath, flank pain, blood in stool, focal numbness weakness in extremity or other symptoms.  Currently denies any pain.  He has not had any trauma.    Allergies:  No Known Allergies    Problem List:    There are no active problems to display for this patient.       Past Medical History:    No past medical history on file.    Past Surgical History:    No past surgical history on file.    Family History:    No family history on file.    Social History:  Marital Status:   [2]  Social History     Tobacco Use     Smoking status: Never Smoker     Smokeless tobacco: Never Used   Substance Use Topics     Alcohol use: Not on file     Drug use: Not on file        Medications:         Acetaminophen (TYLENOL PO)       aspirin 81 MG EC tablet       brimonidine (ALPHAGAN-P) 0.15 % ophthalmic solution       dorzolamide-timolol PF (COSOPT) 22.3-6.8 MG/ML opthalmic solution       latanoprost (XALATAN) 0.005 % ophthalmic solution       loperamide (IMODIUM) 2 MG capsule       menthol-zinc oxide (CALMOSEPTINE) 0.44-20.6 % OINT ointment       multivitamin w/minerals (MULTI-VITAMIN) tablet          Review of Systems  All systems reviewed and  "other than pertinent positives and negatives in HPI all other systems are negative.  Physical Exam   BP: (!) 179/74  Pulse: 83  Temp: 97.5  F (36.4  C)  Resp: 18  Height: 180.3 cm (5' 11\")  Weight: 72.6 kg (160 lb)  SpO2: 99 %      Physical Exam  Constitutional:       Appearance: He is not ill-appearing, toxic-appearing or diaphoretic.      Comments: Frail elderly male no acute distress.   HENT:      Head: Normocephalic.      Nose: Nose normal.   Eyes:      Conjunctiva/sclera: Conjunctivae normal.   Neck:      Musculoskeletal: Neck supple.   Cardiovascular:      Rate and Rhythm: Normal rate and regular rhythm.      Pulses: Normal pulses.      Heart sounds: Normal heart sounds. No murmur.   Pulmonary:      Effort: Pulmonary effort is normal.      Breath sounds: Normal breath sounds. No wheezing, rhonchi or rales.   Chest:      Chest wall: No tenderness.   Abdominal:      General: Abdomen is flat. Bowel sounds are normal.      Palpations: Abdomen is soft.      Tenderness: There is no abdominal tenderness.      Comments: Mild fullness of suprapubic region with minimal tenderness to palpation no guarding or rebound is present bowel sounds are positive.  There is no flank pain.   Musculoskeletal: Normal range of motion.         General: No tenderness.      Right lower leg: No edema.      Left lower leg: No edema.      Comments: There is no calf tenderness palpable   Skin:     General: Skin is warm and dry.      Capillary Refill: Capillary refill takes less than 2 seconds.      Findings: No rash.   Neurological:      General: No focal deficit present.      Mental Status: He is oriented to person, place, and time.      Motor: No weakness.      Coordination: Coordination normal.   Psychiatric:         Mood and Affect: Mood normal.         ED Course        Procedures               Critical Care time:  none               Results for orders placed or performed during the hospital encounter of 11/07/20 (from the past 24 " hour(s))   UA reflex to Microscopic   Result Value Ref Range    Color Urine Yellow     Appearance Urine Cloudy     Glucose Urine Negative NEG^Negative mg/dL    Bilirubin Urine Negative NEG^Negative    Ketones Urine Negative NEG^Negative mg/dL    Specific Gravity Urine 1.014 1.003 - 1.035    Blood Urine Large (A) NEG^Negative    pH Urine 5.0 5.0 - 7.0 pH    Protein Albumin Urine 100 (A) NEG^Negative mg/dL    Urobilinogen mg/dL 0.0 0.0 - 2.0 mg/dL    Nitrite Urine Negative NEG^Negative    Leukocyte Esterase Urine Large (A) NEG^Negative    Source Midstream Urine     RBC Urine >182 (H) 0 - 2 /HPF    WBC Urine >182 (H) 0 - 5 /HPF    Bacteria Urine Few (A) NEG^Negative /HPF    Mucous Urine Present (A) NEG^Negative /LPF       Medications   cefTRIAXone in d5w (ROCEPHIN) intermittent infusion 1 g (0 g Intravenous Stopped 11/7/20 1825)     Results for orders placed or performed during the hospital encounter of 11/07/20   Abd/pelvis CT - no contrast - Stone Protocol    Narrative    CT ABDOMEN AND PELVIS WITHOUT CONTRAST 11/7/2020 3:45 PM    CLINICAL HISTORY: Hematuria. History of bladder CA.    TECHNIQUE: CT scan of the abdomen and pelvis was performed without IV  contrast. Multiplanar reformats were obtained. Dose reduction  techniques were used.  CONTRAST: None.    COMPARISON: None.    FINDINGS:   LOWER CHEST: Normal.    HEPATOBILIARY: Tiny low-attenuation lesions are noted in the left  hepatic lobe on image 39 of series 5, too small to characterize. Liver  is otherwise unremarkable. No evidence of fatty infiltration. No  calcified gallstones.    PANCREAS: Normal.    SPLEEN: Normal.    ADRENAL GLANDS: Mild bilateral adrenal thickening is noted.    KIDNEYS/BLADDER: No urinary tract calculi or hydronephrosis. Scattered  vascular calcifications are noted in the right and left renal hilum.  The bladder is distended with multiple diverticula and wall thickening  anteriorly possibly a bladder wall mass measuring 3.6 x 1.0  cm.    BOWEL: No bowel obstruction, diverticulitis or appendicitis.    LYMPH NODES: No enlarged abdominal or pelvic lymph nodes.    VASCULATURE: Calcified plaque noted in the aorta and branch vessels.  No aneurysm.    PELVIC ORGANS: Prostate gland is enlarged. Multiple bladder  diverticula as described.    OTHER: None.    MUSCULOSKELETAL: Degenerative spine changes are present. Inflammation  and soft tissue thickening noted in the inferior right and left  buttock region possibly reflecting decubitus ulcerations. No air in  the soft tissues.      Impression    IMPRESSION:   1.  No urinary tract calculi or hydronephrosis. 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 Prostate  gland enlargement suggesting chronic bladder outlet obstruction as  well. Bladder wall thickening anteriorly is suspicious for bladder  neoplasm potentially the cause of patient's hematuria.  2. Indeterminate low-attenuation liver lesions. Follow-up liver MRI  could be performed for better characterization. Benign and malignant  etiologies are possible. No prior exam available for comparison.    GABY BRAUN MD     Assessments & Plan (with Medical Decision Making) records were reviewed.  A bladder scan revealed possible 650 mL of fluid present in bladder patient.  Patient CBC was without significant abnormality.  Basic metabolic panel was unremarkable.  INR was slightly elevated at 1.25 PTT 32.  Urinalysis with large blood large leukocyte Estrace greater than 182 RBCs greater than 182 WBCs.  I discussed placing a Gupta catheter but will hold off until results CT scan.  Patient does not want a Gupta at this point as he stated he he is able to urinate he just put out 100 mL.  Urine culture was sent.  CT scan stone protocol revealed no urinary tract calculi or hydronephrosis.  Bladder was distended with an enlarged prostate.  Bladder wall thickening is consistent with a bladder neoplasm.  There were also a few liver  lesions which will need follow-up and possible MRI scan follow-up 89 years old this may be further assessed by primary.  Patient voided some more and a straight cath was obtained.  I talked with patient about leaving a Gupta in for some time but he does not want this done.  He understands he may have to return if he is unable to void.  I commended treat the patient with a dose of ceftriaxone and have him take Keflex we will await culture results and have him follow-up with urology early next week for further assessment and care.  Patient and wife are in agreement this plan.  Patient's blood pressure has been elevated throughout his stay and review of his records his blood pressure has been moderately to significantly elevated in all his recent visits.  I do not feel comfortable dropping his blood pressure at this time and have advised him he needs to recheck his blood pressure with his primary care on Monday.  They are in agreement with this.     I have reviewed the nursing notes.    I have reviewed the findings, diagnosis, plan and need for follow up with the patient.       Discharge Medication List as of 11/7/2020  6:25 PM      START taking these medications    Details   cephALEXin (KEFLEX) 500 MG capsule Take 1 capsule (500 mg) by mouth 3 times daily for 5 days, Disp-15 capsule, R-0, Local Print             Final diagnoses:   Hematuria, unspecified type   Urinary retention   Malignant neoplasm of urinary bladder, unspecified site (H)   Acute urinary tract infection   Secondary hypertension       11/7/2020   [facility] EMERGENCY DEPT     Moustapha, Hansel Borjas MD  11/09/20 [id number]

## 2020-11-08 LAB
BACTERIA SPEC CULT: NO GROWTH
Lab: NORMAL
SPECIMEN SOURCE: NORMAL

## 2020-11-08 NOTE — DISCHARGE INSTRUCTIONS
Return if symptoms worsen or new symptoms develop.  Follow-up with primary care physician next week for recheck of your blood pressure.  Drink plenty of fluids.  Take antibiotic as directed.  A urine culture was sent we will call if the culture does not cover your infection.  We offered a Gupta catheter left in but you did not want this.  Make sure you are draining your bladder.  If increased bladder pain fevers decreased urine output rash or other symptoms present please return for recheck.  Your blood pressure was elevated and needs to be rechecked by your primary care on Monday.

## 2020-11-09 NOTE — RESULT ENCOUNTER NOTE
Emergency Dept/Urgent Care discharge antibiotic (if prescribed): Cephalexin (Keflex) 500 mg capsule, 1 capsule (500 mg) by mouth 3 times daily for 5 days.  Date of Rx (if applicable):  11/7/20  No changes in treatment per Urine culture protocol.

## 2020-11-10 ENCOUNTER — TELEPHONE (OUTPATIENT)
Dept: EMERGENCY MEDICINE | Facility: CLINIC | Age: 85
End: 2020-11-10

## 2020-11-10 ENCOUNTER — COMMUNICATION - HEALTHEAST (OUTPATIENT)
Dept: FAMILY MEDICINE | Facility: CLINIC | Age: 85
End: 2020-11-10

## 2020-11-10 NOTE — TELEPHONE ENCOUNTER
"[facility] Emergency Department Lab result notification:    [facility] ED lab result protocol used  Urine culture    Reason for call  Notify of lab results, assess symptoms,  review ED providers recommendations/discharge instructions (if necessary) and advise per ED lab result f/u protocol    Lab Result   Final urine culture report shows \"NO GROWTH\" and is NEGATIVE.  Emergency Dept discharge antibiotic: Cephalexin (Keflex) 500 mg capsule, 1 capsule (500 mg) by mouth 3 times daily for 5 days.  Is ED discharge Rx antibiotic for UTI only (Yes/No): Yes  Recommendations per [facility] ED Lab result protocol - Urine culture protocol.  Information table from ED Provider visit on 11/7/2020  Symptoms reported at ED visit (Chief complaint, HPI) Hematuria        blood in urine today      HPI  Gabriel Esposito is a 89 year old male with past medical history significant for hypertension, hyperlipidemia, GERD, bladder tumor with recent cystoscopy with tumor removal at the end of September who presents the emergency department complaining of hematuria.  Patient states he had some issues with a Gupta catheter after surgery and then developed an infection and could not go through the bladder treatment for the cancer yet.  He states today he woke up when he peed large amount of blood came out and then it cleared.  He has not had significant blood since that time.  He has had some mild bladder spasms but otherwise denies any fevers chills nausea vomiting diarrhea, chest pain, shortness of breath, flank pain, blood in stool, focal numbness weakness in extremity or other symptoms.  Currently denies any pain.  He has not had any trauma     ED providers Impression and Plan (applicable information) Assessments & Plan (with Medical Decision Making) records were reviewed.  A bladder scan revealed possible 650 mL of fluid present in bladder patient.  Patient CBC was without significant abnormality.  Basic metabolic panel was " "unremarkable.  INR was slightly elevated at 1.25 PTT 32.  Urinalysis with large blood large leukocyte Estrace greater than 182 RBCs greater than 182 WBCs.  I discussed placing a Gupta catheter but will hold off until results CT scan.  Patient does not want a Gupta at this point as he stated he he is able to urinate he just put out 100 mL.  Urine culture was sent.  CT scan stone protocol revealed no urinary tract calculi or hydronephrosis.  Bladder was distended with an enlarged prostate.  Bladder wall thickening is consistent with a bladder neoplasm.  There were also a few liver lesions which will need follow-up and possible MRI scan follow-up 89 years old this may be further assessed by primary.  Patient voided some more and a straight cath was obtained.  I talked with patient about leaving a Gupta in for some time but he does not want this done.  He understands he may have to return if he is unable to void.  I commended treat the patient with a dose of ceftriaxone and have him take Keflex we will await culture results and have him follow-up with urology early next week for further assessment and care.  Patient and wife are in agreement this plan.  Patient's blood pressure has been elevated throughout his stay and review of his records his blood pressure has been moderately to significantly elevated in all his recent visits.  I do not feel comfortable dropping his blood pressure at this time and have advised him he needs to recheck his blood pressure with his primary care on Monday.  They are in agreement with this.   Miscellaneous information na     RN Assessment (Patient s current Symptoms), include time called.  [Insert Left message here if message left]  3:43PM: Spoke with the patient and his wife. They state that he has a procedure scheduled for tomorrow for his bladder cancer, \"They use live bacteria to kills the cells.\" Has been urinating \"I don't know if it's full amounts.\" No visible blood noted in the " urine.  RN Recommendations/Instructions per [facility] ED lab result protocol  Patient notified of lab result and treatment recommendation.   The patient was advised to let his urology provider know his results and they can determine if he should continue taking the antibiotic or stop it.   The patient is comfortable with the information given and has no further questions.     Please Contact your PCP clinic or return to the Emergency department if your:    Symptoms worsen or other concerning symptom's.    PCP follow-up Questions asked: YES       [RN Name]  Bibi Ferrer RN  [employer] [facility] RN  Lung Nodule and ED Lab Result RN  Epic pool (ED late result f/u RN): [id number]  [facility] INCIDENTAL RADIOLOGY F/U NURSES: [id number]  # [phone]      Copy of Lab result   Urine Culture Aerobic Bacterial  Order: [id number]  Status:  Final result   Visible to patient:  No (inaccessible in MyChart) Dx:  Hematuria, unspecified type  Specimen Information: Midstream Urine        Component 3d ago   Specimen Description Midstream Urine    Special Requests Specimen received in preservative    Culture Micro No growth    [facility]         Specimen Collected: 11/07/20 11:00 AM Last Resulted: 11/08/20  9:30 PM

## 2020-12-28 ENCOUNTER — OFFICE VISIT - HEALTHEAST (OUTPATIENT)
Dept: FAMILY MEDICINE | Facility: CLINIC | Age: 85
End: 2020-12-28

## 2020-12-28 DIAGNOSIS — C68.9 UROTHELIAL CARCINOMA (H): ICD-10-CM

## 2020-12-28 DIAGNOSIS — I10 HYPERTENSION, UNSPECIFIED TYPE: ICD-10-CM

## 2020-12-28 DIAGNOSIS — N30.01 ACUTE CYSTITIS WITH HEMATURIA: ICD-10-CM

## 2020-12-28 LAB
ALBUMIN UR-MCNC: ABNORMAL MG/DL
APPEARANCE UR: ABNORMAL
BACTERIA #/AREA URNS HPF: ABNORMAL HPF
BILIRUB UR QL STRIP: NEGATIVE
COLOR UR AUTO: YELLOW
GLUCOSE UR STRIP-MCNC: NEGATIVE MG/DL
HGB UR QL STRIP: ABNORMAL
KETONES UR STRIP-MCNC: NEGATIVE MG/DL
LEUKOCYTE ESTERASE UR QL STRIP: ABNORMAL
NITRATE UR QL: NEGATIVE
PH UR STRIP: 5.5 [PH] (ref 5–8)
RBC #/AREA URNS AUTO: ABNORMAL HPF
SP GR UR STRIP: 1.02 (ref 1–1.03)
SQUAMOUS #/AREA URNS AUTO: ABNORMAL LPF
UROBILINOGEN UR STRIP-ACNC: ABNORMAL
WBC #/AREA URNS AUTO: ABNORMAL HPF

## 2020-12-31 LAB
BACTERIA SPEC CULT: ABNORMAL
BACTERIA SPEC CULT: ABNORMAL

## 2021-04-02 ENCOUNTER — RECORDS - HEALTHEAST (OUTPATIENT)
Dept: LAB | Facility: CLINIC | Age: 86
End: 2021-04-02

## 2021-04-02 LAB
SARS-COV-2 PCR COMMENT: NORMAL
SARS-COV-2 RNA SPEC QL NAA+PROBE: NEGATIVE
SARS-COV-2 VIRUS SPECIMEN SOURCE: NORMAL

## 2021-04-07 ENCOUNTER — RECORDS - HEALTHEAST (OUTPATIENT)
Dept: ADMINISTRATIVE | Facility: OTHER | Age: 86
End: 2021-04-07

## 2021-04-07 ENCOUNTER — HOSPITAL ENCOUNTER (EMERGENCY)
Facility: CLINIC | Age: 86
Discharge: HOME | End: 2021-04-07
Attending: STUDENT IN AN ORGANIZED HEALTH CARE EDUCATION/TRAINING PROGRAM | Admitting: STUDENT IN AN ORGANIZED HEALTH CARE EDUCATION/TRAINING PROGRAM
Payer: COMMERCIAL

## 2021-04-07 ENCOUNTER — APPOINTMENT (OUTPATIENT)
Dept: CT IMAGING | Facility: CLINIC | Age: 86
End: 2021-04-07
Attending: STUDENT IN AN ORGANIZED HEALTH CARE EDUCATION/TRAINING PROGRAM
Payer: COMMERCIAL

## 2021-04-07 ENCOUNTER — APPOINTMENT (OUTPATIENT)
Dept: GENERAL RADIOLOGY | Facility: CLINIC | Age: 86
End: 2021-04-07
Attending: STUDENT IN AN ORGANIZED HEALTH CARE EDUCATION/TRAINING PROGRAM
Payer: COMMERCIAL

## 2021-04-07 VITALS
HEART RATE: 79 BPM | DIASTOLIC BLOOD PRESSURE: 79 MMHG | RESPIRATION RATE: 12 BRPM | WEIGHT: 145 LBS | HEIGHT: 71 IN | SYSTOLIC BLOOD PRESSURE: 182 MMHG | OXYGEN SATURATION: 99 % | TEMPERATURE: 98.8 F | BODY MASS INDEX: 20.3 KG/M2
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
## 2021-04-07 DIAGNOSIS — N39.0 URINARY TRACT INFECTION WITHOUT HEMATURIA, SITE UNSPECIFIED: ICD-10-CM

## 2021-04-07 PROBLEM — E78.49 OTHER HYPERLIPIDEMIA: Status: ACTIVE | Noted: 2019-05-13

## 2021-04-07 PROBLEM — R33.9 RETENTION OF URINE, UNSPECIFIED: Status: ACTIVE | Noted: 2019-05-13

## 2021-04-07 PROBLEM — M62.81 MUSCLE WEAKNESS (GENERALIZED): Status: ACTIVE | Noted: 2019-05-13

## 2021-04-07 PROBLEM — R73.09 OTHER ABNORMAL GLUCOSE: Status: ACTIVE | Noted: 2019-05-13

## 2021-04-07 PROBLEM — N40.1 BENIGN PROSTATIC HYPERPLASIA WITH LOWER URINARY TRACT SYMPTOMS: Status: ACTIVE | Noted: 2019-05-13

## 2021-04-07 PROBLEM — M25.559 PAIN IN UNSPECIFIED HIP: Status: ACTIVE | Noted: 2019-05-13

## 2021-04-07 PROBLEM — E87.70 FLUID OVERLOAD, UNSPECIFIED: Status: ACTIVE | Noted: 2019-05-13

## 2021-04-07 PROBLEM — H26.9 CATARACT: Status: ACTIVE | Noted: 2021-01-12

## 2021-04-07 PROBLEM — I10 HYPERTENSION: Status: ACTIVE | Noted: 2018-01-23

## 2021-04-07 PROBLEM — M54.50 LOW BACK PAIN: Status: ACTIVE | Noted: 2019-05-13

## 2021-04-07 PROBLEM — A41.9 SEPSIS (H): Status: ACTIVE | Noted: 2020-08-14

## 2021-04-07 PROBLEM — S91.309D: Status: ACTIVE | Noted: 2019-05-13

## 2021-04-07 PROBLEM — N35.919 UNSPECIFIED URETHRAL STRICTURE, MALE, UNSPECIFIED SITE: Status: ACTIVE | Noted: 2019-05-13

## 2021-04-07 PROBLEM — H40.9 UNSPECIFIED GLAUCOMA: Status: ACTIVE | Noted: 2019-05-13

## 2021-04-07 PROBLEM — C68.9 TRANSITIONAL CELL CARCINOMA (H): Status: ACTIVE | Noted: 2020-10-01

## 2021-04-07 PROBLEM — K21.9 GASTRO-ESOPHAGEAL REFLUX DISEASE WITHOUT ESOPHAGITIS: Status: ACTIVE | Noted: 2019-05-13

## 2021-04-07 PROBLEM — R00.1 SINUS BRADYCARDIA: Status: ACTIVE | Noted: 2019-05-13

## 2021-04-07 PROBLEM — R26.2 DIFFICULTY IN WALKING, NOT ELSEWHERE CLASSIFIED: Status: ACTIVE | Noted: 2019-05-13

## 2021-04-07 PROBLEM — R31.9 HEMATURIA: Status: ACTIVE | Noted: 2020-09-29

## 2021-04-07 LAB
ALBUMIN SERPL-MCNC: 3.8 G/DL (ref 3.4–5)
ALBUMIN UR-MCNC: 30 MG/DL
ALP SERPL-CCNC: 66 U/L (ref 40–150)
ALT SERPL W P-5'-P-CCNC: 15 U/L (ref 0–70)
ANION GAP SERPL CALCULATED.3IONS-SCNC: 8 MMOL/L (ref 3–14)
APPEARANCE UR: ABNORMAL
AST SERPL W P-5'-P-CCNC: 20 U/L (ref 0–45)
BASOPHILS # BLD AUTO: 0 10E9/L (ref 0–0.2)
BASOPHILS NFR BLD AUTO: 0.4 %
BILIRUB SERPL-MCNC: 0.6 MG/DL (ref 0.2–1.3)
BILIRUB UR QL STRIP: NEGATIVE
BUN SERPL-MCNC: 25 MG/DL (ref 7–30)
CALCIUM SERPL-MCNC: 8.9 MG/DL (ref 8.5–10.1)
CHLORIDE SERPL-SCNC: 107 MMOL/L (ref 94–109)
CO2 SERPL-SCNC: 25 MMOL/L (ref 20–32)
COLOR UR AUTO: YELLOW
CREAT SERPL-MCNC: 1.11 MG/DL (ref 0.66–1.25)
DIFFERENTIAL METHOD BLD: NORMAL
EOSINOPHIL # BLD AUTO: 0 10E9/L (ref 0–0.7)
EOSINOPHIL NFR BLD AUTO: 0.2 %
ERYTHROCYTE [DISTWIDTH] IN BLOOD BY AUTOMATED COUNT: 12.9 % (ref 10–15)
GFR SERPL CREATININE-BSD FRML MDRD: 55 ML/MIN/{1.73_M2}
GLUCOSE SERPL-MCNC: 127 MG/DL (ref 70–99)
GLUCOSE UR STRIP-MCNC: NEGATIVE MG/DL
HCT VFR BLD AUTO: 42.9 % (ref 40–53)
HGB BLD-MCNC: 13.8 G/DL (ref 13.3–17.7)
HGB UR QL STRIP: ABNORMAL
IMM GRANULOCYTES # BLD: 0 10E9/L (ref 0–0.4)
IMM GRANULOCYTES NFR BLD: 0.2 %
KETONES UR STRIP-MCNC: 5 MG/DL
LEUKOCYTE ESTERASE UR QL STRIP: ABNORMAL
LYMPHOCYTES # BLD AUTO: 1.6 10E9/L (ref 0.8–5.3)
LYMPHOCYTES NFR BLD AUTO: 19.2 %
MCH RBC QN AUTO: 31.4 PG (ref 26.5–33)
MCHC RBC AUTO-ENTMCNC: 32.2 G/DL (ref 31.5–36.5)
MCV RBC AUTO: 98 FL (ref 78–100)
MONOCYTES # BLD AUTO: 0.9 10E9/L (ref 0–1.3)
MONOCYTES NFR BLD AUTO: 10.4 %
MUCOUS THREADS #/AREA URNS LPF: PRESENT /LPF
NEUTROPHILS # BLD AUTO: 5.9 10E9/L (ref 1.6–8.3)
NEUTROPHILS NFR BLD AUTO: 69.6 %
NITRATE UR QL: NEGATIVE
NRBC # BLD AUTO: 0 10*3/UL
NRBC BLD AUTO-RTO: 0 /100
PH UR STRIP: 5 PH (ref 5–7)
PLATELET # BLD AUTO: 218 10E9/L (ref 150–450)
POTASSIUM SERPL-SCNC: 3.7 MMOL/L (ref 3.4–5.3)
PROT SERPL-MCNC: 7.9 G/DL (ref 6.8–8.8)
RBC # BLD AUTO: 4.4 10E12/L (ref 4.4–5.9)
RBC #/AREA URNS AUTO: 82 /HPF (ref 0–2)
SODIUM SERPL-SCNC: 140 MMOL/L (ref 133–144)
SOURCE: ABNORMAL
SP GR UR STRIP: 1.02 (ref 1–1.03)
UROBILINOGEN UR STRIP-MCNC: 0 MG/DL (ref 0–2)
WBC # BLD AUTO: 8.4 10E9/L (ref 4–11)
WBC #/AREA URNS AUTO: >182 /HPF (ref 0–5)
WBC CLUMPS #/AREA URNS HPF: PRESENT /HPF

## 2021-04-07 PROCEDURE — 93010 ELECTROCARDIOGRAM REPORT: CPT | Performed by: STUDENT IN AN ORGANIZED HEALTH CARE EDUCATION/TRAINING PROGRAM

## 2021-04-07 PROCEDURE — 99285 EMERGENCY DEPT VISIT HI MDM: CPT | Mod: 25 | Performed by: STUDENT IN AN ORGANIZED HEALTH CARE EDUCATION/TRAINING PROGRAM

## 2021-04-07 PROCEDURE — 72125 CT NECK SPINE W/O DYE: CPT

## 2021-04-07 PROCEDURE — 87186 SC STD MICRODIL/AGAR DIL: CPT | Performed by: STUDENT IN AN ORGANIZED HEALTH CARE EDUCATION/TRAINING PROGRAM

## 2021-04-07 PROCEDURE — 72131 CT LUMBAR SPINE W/O DYE: CPT

## 2021-04-07 PROCEDURE — 70450 CT HEAD/BRAIN W/O DYE: CPT

## 2021-04-07 PROCEDURE — 85025 COMPLETE CBC W/AUTO DIFF WBC: CPT | Performed by: STUDENT IN AN ORGANIZED HEALTH CARE EDUCATION/TRAINING PROGRAM

## 2021-04-07 PROCEDURE — 87086 URINE CULTURE/COLONY COUNT: CPT | Performed by: STUDENT IN AN ORGANIZED HEALTH CARE EDUCATION/TRAINING PROGRAM

## 2021-04-07 PROCEDURE — 93005 ELECTROCARDIOGRAM TRACING: CPT | Performed by: STUDENT IN AN ORGANIZED HEALTH CARE EDUCATION/TRAINING PROGRAM

## 2021-04-07 PROCEDURE — 72170 X-RAY EXAM OF PELVIS: CPT

## 2021-04-07 PROCEDURE — 80053 COMPREHEN METABOLIC PANEL: CPT | Performed by: STUDENT IN AN ORGANIZED HEALTH CARE EDUCATION/TRAINING PROGRAM

## 2021-04-07 PROCEDURE — 250N000013 HC RX MED GY IP 250 OP 250 PS 637: Performed by: STUDENT IN AN ORGANIZED HEALTH CARE EDUCATION/TRAINING PROGRAM

## 2021-04-07 PROCEDURE — 81001 URINALYSIS AUTO W/SCOPE: CPT | Performed by: STUDENT IN AN ORGANIZED HEALTH CARE EDUCATION/TRAINING PROGRAM

## 2021-04-07 PROCEDURE — 72128 CT CHEST SPINE W/O DYE: CPT

## 2021-04-07 PROCEDURE — 87088 URINE BACTERIA CULTURE: CPT | Performed by: STUDENT IN AN ORGANIZED HEALTH CARE EDUCATION/TRAINING PROGRAM

## 2021-04-07 RX ORDER — CEPHALEXIN 500 MG/1
500 CAPSULE ORAL 3 TIMES DAILY
Qty: 30 CAPSULE | Refills: 0 | Status: SHIPPED | OUTPATIENT
Start: 2021-04-07 | End: 2021-04-13

## 2021-04-07 RX ORDER — CEPHALEXIN 500 MG/1
500 CAPSULE ORAL ONCE
Status: COMPLETED | OUTPATIENT
Start: 2021-04-07 | End: 2021-04-07

## 2021-04-07 RX ORDER — ACETAMINOPHEN 325 MG/1
650 TABLET ORAL ONCE
Status: COMPLETED | OUTPATIENT
Start: 2021-04-07 | End: 2021-04-07

## 2021-04-07 RX ADMIN — ACETAMINOPHEN 650 MG: 325 TABLET, FILM COATED ORAL at 17:11

## 2021-04-07 RX ADMIN — CEPHALEXIN 500 MG: 500 CAPSULE ORAL at 20:58

## 2021-04-07 ASSESSMENT — MIFFLIN-ST. JEOR: SCORE: 1296.91

## 2021-04-07 NOTE — ED PROVIDER NOTES
History     Chief Complaint   Patient presents with     Fall     fell yesterday, not on blood thinners, fell back wards in [place] hit back and head. no LOC. back pain     Back Pain     HPI  Gabriel Esposito is a 89 year old male who presents for evaluation of back pain after unwitnessed fall occurring around 2 AM this morning.  Patient recalls feeling dizzy with standing this morning and falling backwards landing on his low back and striking the back of his head, although denies loss of consciousness.  His  heard the fall and arrived to find him laying on the ground, conscious, and helping him to his feet.  There is no report of loss of consciousness, altered mentation, or witnessed seizure activity.  However throughout today he has had difficulty ambulating with use of walker secondary to generalized weakness.  He describes only mild low thoracic and lumbar back pain, denies headache or neck pain.  No other complaints.  Of note, patient mentions he has suffered from lightheadedness with standing for several years and this is not new..    Allergies:  No Known Allergies    Problem List:    Patient Active Problem List    Diagnosis Date Noted     Cataract 01/12/2021     Priority: Medium     Formatting of this note might be different from the original.  Created by Conversion  Formatting of this note might be different from the original.  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     Priority: Medium     Hematuria 09/29/2020     Priority: Medium     Sepsis (H) 08/14/2020     Priority: Medium     Benign prostatic hyperplasia with lower urinary tract symptoms 05/13/2019     Priority: Medium     Difficulty in walking, not elsewhere classified 05/13/2019     Priority: Medium     Fluid overload, unspecified 05/13/2019     Priority: Medium     Gastro-esophageal reflux disease without esophagitis 05/13/2019     Priority: Medium     Low back pain 05/13/2019     Priority: Medium     Muscle  weakness (generalized) 05/13/2019     Priority: Medium     Other abnormal glucose 05/13/2019     Priority: Medium     Formatting of this note might be different from the original.  Created by Conversion       Other hyperlipidemia 05/13/2019     Priority: Medium     Formatting of this note might be different from the original.  Created by Conversion       Pain in unspecified hip 05/13/2019     Priority: Medium     Retention of urine, unspecified 05/13/2019     Priority: Medium     Sinus bradycardia 05/13/2019     Priority: Medium     Unspecified glaucoma 05/13/2019     Priority: Medium     Formatting of this note might be different from the original.  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    Past Surgical History:    No past surgical history on file.    Family History:    No family history on file.    Social History:  Marital Status:   [2]  Social History     Tobacco Use     Smoking status: Never Smoker     Smokeless tobacco: Never Used   Substance Use Topics     Alcohol use: Not on file     Drug use: Not on file        Medications:    cephALEXin (KEFLEX) 500 MG capsule  Acetaminophen (TYLENOL PO)  aspirin 81 MG EC tablet  brimonidine (ALPHAGAN-P) 0.15 % ophthalmic solution  dorzolamide-timolol PF (COSOPT) 22.3-6.8 MG/ML opthalmic solution  latanoprost (XALATAN) 0.005 % ophthalmic solution  loperamide (IMODIUM) 2 MG capsule  menthol-zinc oxide (CALMOSEPTINE) 0.44-20.6 % OINT ointment  multivitamin w/minerals (MULTI-VITAMIN) tablet          Review of Systems  Constitutional:  Negative for fever or recent illness.  Cardiovascular:   "Negative for chest discomfort or palpitations.  Respiratory:  Negative for cough or shortness of breath.   Gastrointestinal:  Negative for abdominal pain, nausea or vomiting.   Musculoskeletal: Positive for low back pain.  Negative for neck pain, hip pain, or extremity injury.  Neurological: For lightheadedness with standing early this morning resulting in fall.  Negative for headache.    All others reviewed and are negative.      Physical Exam   BP: 133/80  Pulse: 93  Temp: 98.8  F (37.1  C)  Resp: 20  Height: 179.1 cm (5' 10.5\")  Weight: 65.8 kg (145 lb)  SpO2: 94 %      Physical Exam  Constitutional:  Well developed, well nourished.  Appears stable and in no acute distress.  Head:  Atraumatic appearance of face.  Negative for Raccoon eyes and Barcenas sign.  No tenderness of facial bones.  No tenderness of skull circumferentially.  Eye:  No proptosis or subconjunctival hemorrhage.  Eyelids appear symmetrical.  PERRLA without pain.  EOMI and denies diplopia.  Oral:  Patient is without trismus or malocclusion.    Ears:  External auditory canals without blood or discharge, tympanic membranes without hemotympanum.  No mastoid region tenderness.  Neck:  No tenderness of midline cervical vertebra.  Ranging neck freely without being held in self-protecting position.   Cardiovascular:  No cyanosis.  RRR.   Respiratory/chest:  Effort normal, no respiratory distress.  CTAB without diminished lung sounds.  Gastrointestinal:  Soft nondistended abdomen.  No tenderness, guarding, rigidity, or rebound tenderness.    Musculoskeletal:  No extremity deformities.  No hip tenderness or pelvic instability.  No step-offs and no tenderness to palpation of midline thoracic or lumbosacral vertebra.  Moves extremities spontaneously and without complaint.  Hip flexion, knee extension, dorsiflexion and plantar flexion intact and equal bilaterally.  Sensation of medial leg, dorsum of foot, and planter aspect of foot are intact.  5/5 strength " of bilateral hand .  Neuro:  Patient is alert and verbally responsive.   Skin:  Skin is warm and dry, not diaphoretic.  No abrasions, contusions, ecchymosis, or lacerations.  Psych:  Normal mood and affect, not overly anxious or clinically intoxicated.    [place] Coma Scale - GCS (calculator)    Motor 6=Obeys commands   Verbal 5=Oriented   Eye Opening 4=Spontaneous   Total: 15       ED Course        Procedures                 EKG Interpretation:      Interpreted by: Praveen Jaimes  Time reviewed: Upon completion    Symptoms at time of EKG: Asymptomatic   Rhythm: Sinus  Rate: 63 bpm  Axis: Normal    Conduction: None atypical   ST Segments/ T Waves: No pathologic ST-elevations or T-wave abnormalities.  Q Waves: None  Comparison to prior: None available     Clinical Impression: No sign of ischemia, occasional PVC        Critical Care time:  none               Results for orders placed or performed during the hospital encounter of 04/07/21 (from the past 24 hour(s))   Comprehensive metabolic panel   Result Value Ref Range    Sodium 140 133 - 144 mmol/L    Potassium 3.7 3.4 - 5.3 mmol/L    Chloride 107 94 - 109 mmol/L    Carbon Dioxide 25 20 - 32 mmol/L    Anion Gap 8 3 - 14 mmol/L    Glucose 127 (H) 70 - 99 mg/dL    Urea Nitrogen 25 7 - 30 mg/dL    Creatinine 1.11 0.66 - 1.25 mg/dL    GFR Estimate 55 (L) >60 mL/min/[1.73_m2]    GFR Estimate If Black 64 >60 mL/min/[1.73_m2]    Calcium 8.9 8.5 - 10.1 mg/dL    Bilirubin Total 0.6 0.2 - 1.3 mg/dL    Albumin 3.8 3.4 - 5.0 g/dL    Protein Total 7.9 6.8 - 8.8 g/dL    Alkaline Phosphatase 66 40 - 150 U/L    ALT 15 0 - 70 U/L    AST 20 0 - 45 U/L   CBC with platelets differential   Result Value Ref Range    WBC 8.4 4.0 - 11.0 10e9/L    RBC Count 4.40 4.4 - 5.9 10e12/L    Hemoglobin 13.8 13.3 - 17.7 g/dL    Hematocrit 42.9 40.0 - 53.0 %    MCV 98 78 - 100 fl    MCH 31.4 26.5 - 33.0 pg    MCHC 32.2 31.5 - 36.5 g/dL    RDW 12.9 10.0 - 15.0 %    Platelet Count 218 150 - 450  10e9/L    Diff Method Automated Method     % Neutrophils 69.6 %    % Lymphocytes 19.2 %    % Monocytes 10.4 %    % Eosinophils 0.2 %    % Basophils 0.4 %    % Immature Granulocytes 0.2 %    Nucleated RBCs 0 0 /100    Absolute Neutrophil 5.9 1.6 - 8.3 10e9/L    Absolute Lymphocytes 1.6 0.8 - 5.3 10e9/L    Absolute Monocytes 0.9 0.0 - 1.3 10e9/L    Absolute Eosinophils 0.0 0.0 - 0.7 10e9/L    Absolute Basophils 0.0 0.0 - 0.2 10e9/L    Abs Immature Granulocytes 0.0 0 - 0.4 10e9/L    Absolute Nucleated RBC 0.0    CT Head w/o Contrast    Narrative    EXAM: CT HEAD W/O CONTRAST  LOCATION: [facility]  DATE/TIME: 4/7/2021 5:41 PM    INDICATION: Head trauma, minor (Age >= 65y).  COMPARISON: None.  TECHNIQUE: Routine CT head without IV contrast. Multiplanar reformats. Dose reduction techniques were used.    FINDINGS:  INTRACRANIAL CONTENTS: 5 x 4 mm dense focus adjacent to the anterior falx cerebri likely reflects a small meningioma in this location without associated mass effect or brain parenchymal edema. No other evidence of mass lesion. No mass effect. No evidence   of acute hemorrhage. No CT evidence of acute infarct. Mild to moderate presumed chronic small vessel ischemic changes. Mild generalized volume loss. No hydrocephalus.     VISUALIZED ORBITS/SINUSES/MASTOIDS: Prior right cataract surgery. Visualized portions of the orbits are otherwise unremarkable. Mild mucosal thickening scattered about the paranasal sinuses. No middle ear or mastoid effusion.    BONES/SOFT TISSUES: No acute abnormality.      Impression    IMPRESSION:  1.  No CT evidence for acute intracranial process.  2.  Brain atrophy and presumed chronic microvascular ischemic changes as above.  3.  Extra-axial dense focus of mineralization adjacent to the anterior falx cerebri likely reflects a small meningioma as detailed above.   Cervical spine CT w/o contrast    Narrative    EXAM: CT CERVICAL SPINE W/O CONTRAST  LOCATION: [facility]  SERVICES  DATE/TIME: 4/7/2021 5:41 PM    INDICATION: Neck trauma (Age >= 65y).  COMPARISON: CT thoracic spine from today.  TECHNIQUE: Routine CT Cervical Spine without IV contrast. Multiplanar reformats. Dose reduction techniques were used.    FINDINGS:  VERTEBRA: 2 mm retrolisthesis C3 upon C4. There are bulky anterior marginal osteophytes from C3-C4 through the upper thoracic spine consistent with DISH. These bridge the majority of the segments. Lucency within the anterior marginal osteophyte at C3-C4   but this demonstrates a chronic type appearance without evidence of adjacent soft tissue swelling to suggest that this represents acute fracture. Vertebral body height is preserved. There is mineralization across many of the disc spaces of the cervical   spine as well. No fracture or posttraumatic subluxation.     CANAL/FORAMINA: No canal or neural foraminal stenosis.    PARASPINAL: No extraspinal abnormality.      Impression    IMPRESSION:  1.  No definite evidence of acute fracture.    2.  Findings consistent with DISH. The bulky anterior marginal osteophytes are discontinuous at the C3-C4 level, but this demonstrates a chronic type appearance without definite evidence of adjacent soft tissue swelling to suggest that this represents   fracture. However, if there is further clinical concern, neck MR could be performed to evaluate for marrow or soft tissue edema, although no such soft tissue edema is demonstrated on this study.   CT Thoracic Spine w/o Contrast    Narrative    EXAM: CT THORACIC SPINE W/O CONTRAST  LOCATION: [facility]  DATE/TIME: 4/7/2021 5:41 PM    INDICATION: Back trauma, no prior imaging (Age >= 16y).  COMPARISON: CT cervical spine and CT lumbar spine from today.  TECHNIQUE: Routine CT thoracic spine without IV contrast. Multiplanar reformats. Dose reduction techniques were used.     FINDINGS:  VERTEBRA: 12 rib-bearing thoracic vertebral segments. Anatomic alignment. There are bulky  anterior marginal osteophytes in the lower cervical and upper thoracic spine consistent with DISH. Vertebral body height is preserved. Mildly accentuated thoracic   kyphosis. No fracture or posttraumatic subluxation.     CANAL/FORAMINA: No canal or neural foraminal stenosis.    PARASPINAL: No extraspinal abnormality.      Impression    IMPRESSION:  1.  No evidence of acute fracture or traumatic malalignment.     Lumbar spine CT w/o contrast    Narrative    EXAM: CT LUMBAR SPINE W/O CONTRAST  LOCATION: [facility]  DATE/TIME: 4/7/2021 5:41 PM    INDICATION: Low back pain, trauma.  COMPARISON: CT thoracic spine from today. MR lumbar spine 5/7/2019 and CT abdomen pelvis 11/7/2020.  TECHNIQUE: Routine CT Lumbar Spine without IV contrast. Multiplanar reformats. Dose reduction techniques were used.     FINDINGS:  VERTEBRA: 5 lumbar type vertebral bodies. There is mineralization across the L5-S1 disc space. Anatomic alignment. As on prior MR study, the L5 level appears partially sacralized. Vertebral body height is preserved. There are bridging osteophytes across   the anterior annulus of numerous disc spaces consistent with DISH. Degenerative changes about both sacroiliac joints. No fracture or posttraumatic subluxation.     CANAL/FORAMINA: No canal or neural foraminal stenosis.    PARASPINAL: The bladder demonstrates diffuse wall thickening, undulation, numerous bladder diverticula, likely related to a component of bladder outlet obstruction. Colonic diverticulosis. Aortic intimal calcification without aneurysm.      Impression    IMPRESSION: No fracture or posttraumatic subluxation. See details above regarding lumbar numbering system.   XR Pelvis 1/2 Views    Narrative    EXAM: XR PELVIS 1/2 VW  LOCATION: [facility]  DATE/TIME: 4/7/2021 6:18 PM    INDICATION: Trauma with low back and pelvic pain.  COMPARISON: None.      Impression    IMPRESSION: Normal joint spaces and alignment. No fracture.    UA with Microscopic reflex to Culture    Specimen: Midstream Urine   Result Value Ref Range    Color Urine Yellow     Appearance Urine Cloudy     Glucose Urine Negative NEG^Negative mg/dL    Bilirubin Urine Negative NEG^Negative    Ketones Urine 5 (A) NEG^Negative mg/dL    Specific Gravity Urine 1.017 1.003 - 1.035    Blood Urine Large (A) NEG^Negative    pH Urine 5.0 5.0 - 7.0 pH    Protein Albumin Urine 30 (A) NEG^Negative mg/dL    Urobilinogen mg/dL 0.0 0.0 - 2.0 mg/dL    Nitrite Urine Negative NEG^Negative    Leukocyte Esterase Urine Large (A) NEG^Negative    Source Midstream Urine     WBC Urine >182 (H) 0 - 5 /HPF    RBC Urine 82 (H) 0 - 2 /HPF    WBC Clumps Present (A) NEG^Negative /HPF    Mucous Urine Present (A) NEG^Negative /LPF       Medications   acetaminophen (TYLENOL) tablet 650 mg (650 mg Oral Given 4/7/21 1711)   cephALEXin (KEFLEX) capsule 500 mg (500 mg Oral Given 4/7/21 2058)       Assessments & Plan (with Medical Decision Making)   Gabriel Esposito is a 89 year old male who presents to the department for evaluation after reports of a fall earlier this morning in which he landed on his back and struck his head, no loss of consciousness or altered mentation by report.  No signs of injury or trauma on examination.  CT imaging head/brain and vertebral spine without acute pathology.  The patient has been upright and ambulatory with use of walker, somewhat weak but maintains that he feels safe returning to assisted living facility.  I spoke with patient's significant other as well as the patient's son about his episodes of lightheadedness and my concern for future fall risk, they all agree to monitor patient closely but would like to return to assisted living facility tonight.  However, before patient left he described a burning sensation with urination and with history of urinary tract infections urinalysis was obtained.  There is some concern for UTI given the pyuria, will be started on oral antibiotic  cephalexin pending urine culture results.      Disclaimer:  This note consists of symbols derived from keyboarding, dictation, and/or voice recognition software.  As a result, there may be errors in the script that have gone undetected.  Please consider this when interpreting information found in the chart.        I have reviewed the nursing notes.    I have reviewed the findings, diagnosis, plan and need for follow up with the patient.       Discharge Medication List as of 4/7/2021  8:35 PM      START taking these medications    Details   cephALEXin (KEFLEX) 500 MG capsule Take 1 capsule (500 mg) by mouth 3 times daily for 10 days, Disp-30 capsule, R-0, E-Prescribe             Final diagnoses:   Urinary tract infection without hematuria, site unspecified   Fall, initial encounter       4/7/2021   [facility] EMERGENCY DEPT     Praveen Jaimes DO  04/07/21 3551

## 2021-04-08 NOTE — RESULT ENCOUNTER NOTE
[facility] Emergency Dept discharge antibiotic (if prescribed): Cephalexin (Keflex) 500 mg capsule, 1 capsule (500 mg) by mouth 3 times daily for 10 days.  Date of Rx (if applicable):  4/7/21  No changes in treatment per [facility] ED Lab Result Urine culture protocol.

## 2021-04-10 LAB
BACTERIA SPEC CULT: ABNORMAL
BACTERIA SPEC CULT: ABNORMAL
Lab: ABNORMAL
SPECIMEN SOURCE: ABNORMAL

## 2021-04-11 ENCOUNTER — TELEPHONE (OUTPATIENT)
Dept: EMERGENCY MEDICINE | Facility: CLINIC | Age: 86
End: 2021-04-11

## 2021-04-11 DIAGNOSIS — N39.0 URINARY TRACT INFECTION: ICD-10-CM

## 2021-04-11 NOTE — TELEPHONE ENCOUNTER
[facility] Emergency Department Lab result notification [Adult-Male]    [place] ED lab result protocol used  Urine Culture    Reason for call  Notify of lab results, assess symptoms,  review ED providers recommendations/discharge instructions (if necessary) and advise per ED lab result f/u protocol    Lab Result (including Rx patient on, if applicable)  Final Urine Culture Report on 4/10/21  [facility] Emergency Dept discharge antibiotic prescribed: Cephalexin (Keflex) 500 mg capsule, 1 capsule (500 mg) by mouth 3 times daily for 10 days.  #1. Bacteria, >100,000 colonies/mL Enterococcus faecalis,  is [NOT TESTED] to antibiotic.   #2. Bacteria, 10,000 to 50,000 colonies/mL Staphylococcus epidermidis is [RESISTANT] to antibiotic.  Recommendations in treatment per [facility] ED lab result Urine Culture protocol.       Information table from Emergency Dept Provider visit on 4/7/21  Symptoms reported at ED visit (Chief complaint, HPI)  Fall       fell yesterday, not on blood thinners, fell back wards in [place] hit back and head. no LOC. back pain     Back Pain      HPI  Gabriel Esposito is a 89 year old male who presents for evaluation of back pain after unwitnessed fall occurring around 2 AM this morning.  Patient recalls feeling dizzy with standing this morning and falling backwards landing on his low back and striking the back of his head, although denies loss of consciousness.  His  heard the fall and arrived to find him laying on the ground, conscious, and helping him to his feet.  There is no report of loss of consciousness, altered mentation, or witnessed seizure activity.  However throughout today he has had difficulty ambulating with use of walker secondary to generalized weakness.  He describes only mild low thoracic and lumbar back pain, denies headache or neck pain.  No other complaints.  Of note, patient mentions he has suffered from lightheadedness with standing for several  years and this is not new..     Significant Medical hx, if applicable (i.e. CKD, diabetes) Reviewed   Allergies No Known Allergies   Weight, if applicable Wt Readings from Last 2 Encounters:   04/07/21 65.8 kg (145 lb)   11/07/20 72.6 kg (160 lb)      Coumadin/Warfarin [Yes /No] No   Creatinine Level (mg/dl) Creatinine   Date Value Ref Range Status   04/07/2021 1.11 0.66 - 1.25 mg/dL Final      Creatinine clearance (ml/min), if applicable Serum creatinine: 1.11 mg/dL 04/07/21 1710  Estimated creatinine clearance: 35.4 mL/min   ED providers Impression and Plan (applicable information) Gabriel Esposito is a 89 year old male who presents to the department for evaluation after reports of a fall earlier this morning in which he landed on his back and struck his head, no loss of consciousness or altered mentation by report.  No signs of injury or trauma on examination.  CT imaging head/brain and vertebral spine without acute pathology.  The patient has been upright and ambulatory with use of walker, somewhat weak but maintains that he feels safe returning to assisted living facility.  I spoke with patient's significant other as well as the patient's son about his episodes of lightheadedness and my concern for future fall risk, they all agree to monitor patient closely but would like to return to assisted living facility tonight.  However, before patient left he described a burning sensation with urination and with history of urinary tract infections urinalysis was obtained.  There is some concern for UTI given the pyuria, will be started on oral antibiotic cephalexin pending urine culture results.   ED diagnosis Urinary tract infection without hematuria, site unspecified   Fall, initial encounter         ED provider Praveen Jaimes, DO      RN Assessment (Patient s current Symptoms), include time called.  [Insert Left message here if message left]  12:59 Unable to Leave message phone busy      [RN Name]  Mad[facility]  [email or web address]  [employer] [facility]  Emergency Dept Lab Result RN  Ph# [phone]      Copy of Lab result  Contains abnormal data Urine Culture Aerobic Bacterial  Order: [id number]  Status:  Final result   Visible to patient:  No (inaccessible in MyChart) Dx:  Urinary tract infection without hemat...  Specimen Information: Unspecified Urine        Component 4d ago   Specimen Description Unspecified Urine    Special Requests Specimen received in preservative    Culture Micro Abnormal   >100,000 colonies/mL   Enterococcus faecalis     Culture Micro Abnormal   10,000 to 50,000 colonies/mL   Staphylococcus epidermidis     [facility]   Susceptibility    Staphylococcus epidermidis (2)    Antibiotic Interpretation Sensitivity Method Status   CIPROFLOXACIN Resistant >=8 ug/mL ROBERT Final   GENTAMICIN Sensitive <=0.5 ug/mL ROBERT Final   LEVOFLOXACIN Resistant >=8 ug/mL ROBERT Final   NITROFURANTOIN Sensitive <=16 ug/mL ROBERT Final   OXACILLIN Resistant >=4 ug/mL ROBERT Final   TETRACYCLINE Sensitive <=1 ug/mL ROBERT Final   VANCOMYCIN Sensitive 1 ug/mL ROBERT Final   Trimethoprim/Sulfa Resistant   ROBERT Final   Enterococcus faecalis (1)    Antibiotic Interpretation Sensitivity Method Status   AMPICILLIN Sensitive <=2 ug/mL ROBERT Final   NITROFURANTOIN Sensitive <=16 ug/mL ROBERT Final   PENICILLIN Sensitive 8 ug/mL ROBERT Final   VANCOMYCIN Sensitive 1 ug/mL ROBERT Final    Condensed View         Specimen Collected: 04/07/21  7:20 PM Last Resulted: 04/10/21 11:49 PM

## 2021-04-13 NOTE — TELEPHONE ENCOUNTER
"[facility] Emergency Department Lab result notification [Adult-Male]     [place] ED lab result protocol used  Urine Culture     Reason for call  Notify of lab results, assess symptoms,  review ED providers recommendations/discharge instructions (if necessary) and advise per ED lab result f/u protocol     Lab Result (including Rx patient on, if applicable)  Final Urine Culture Report on 4/10/21  [facility] Emergency Dept discharge antibiotic prescribed: Cephalexin (Keflex) 500 mg capsule, 1 capsule (500 mg) by mouth 3 times daily for 10 days.  #1. Bacteria, >100,000 colonies/mL Enterococcus faecalis,  is [NOT TESTED] to antibiotic.   #2. Bacteria, 10,000 to 50,000 colonies/mL Staphylococcus epidermidis is [RESISTANT] to antibiotic.  Recommendations in treatment per [facility] ED lab result Urine Culture protocol.    RN Assessment (Patient s current Symptoms), include time called.  [Insert Left message here if message left]  1:28PM: Spoke with patient and patient's wife. The wife states that the patient is \"the same.\" Has seen his PCP on 4/9. Is having frequency with urination.     RN Recommendations/Instructions per [place] ED lab result protocol  Patient notified of lab result and treatment recommendations.  Rx for Amoxicillin-Clavulanate (Augmentin) 875-125 mg PO tablet, 1 tablet by mouth 2 times daily for 7 days sent to [Pharmacy - [facility] in [place]].  RN reviewed information about UTI's.  Advised to contact the PCP to update him on the plan of care with the antibiotics.   Advised to stop Keflex and start Augmentin.   The patient and wife are comfortable with the information given and have no further questions.     Please Contact your PCP clinic or return to the Emergency department if your:    Symptoms worsen or other concerning symptom's.    PCP follow-up Questions asked: YES       [RN Name]  Bibi Ferrer RN  [facility] RN  Lung Nodule and ED Lab Result RN  Epic " pool (ED late result f/u RN): P [id number]  [facility] INCIDENTAL RADIOLOGY F/U NURSES: P [id number]  # [phone]

## 2021-04-21 ENCOUNTER — RECORDS - HEALTHEAST (OUTPATIENT)
Dept: LAB | Facility: CLINIC | Age: 86
End: 2021-04-21

## 2021-04-28 ENCOUNTER — RECORDS - HEALTHEAST (OUTPATIENT)
Dept: LAB | Facility: CLINIC | Age: 86
End: 2021-04-28

## 2021-05-05 ENCOUNTER — RECORDS - HEALTHEAST (OUTPATIENT)
Dept: LAB | Facility: CLINIC | Age: 86
End: 2021-05-05

## 2021-05-11 ENCOUNTER — RECORDS - HEALTHEAST (OUTPATIENT)
Dept: LAB | Facility: CLINIC | Age: 86
End: 2021-05-11

## 2021-05-26 VITALS — SYSTOLIC BLOOD PRESSURE: 150 MMHG | DIASTOLIC BLOOD PRESSURE: 58 MMHG

## 2021-05-27 VITALS
OXYGEN SATURATION: 99 % | DIASTOLIC BLOOD PRESSURE: 87 MMHG | HEART RATE: 88 BPM | SYSTOLIC BLOOD PRESSURE: 192 MMHG | RESPIRATION RATE: 12 BRPM

## 2021-05-30 VITALS — BODY MASS INDEX: 21.87 KG/M2 | WEIGHT: 156.19 LBS | HEIGHT: 71 IN

## 2021-05-30 NOTE — TELEPHONE ENCOUNTER
Left detailed message for Stefanie at [facility], relaying MD message below.  Stefanie to call back if needing admission paperwork before pt is seen on 7/26. Otherwise, paperwork can be faxed after pt has been seen on 7/26/19.

## 2021-05-30 NOTE — TELEPHONE ENCOUNTER
Please complete paperwork for Cherri after appt on Friday.  I believe you have the forms on your desk.

## 2021-05-30 NOTE — TELEPHONE ENCOUNTER
Name of form/paperwork: Other:  Admission orders for Assisted Living at [facility]  Have you been seen for this request: N/A  Do we have the form: Stated was fax over to clinic a week or two ago  When is form needed by: asap  How would you like the form returned: faxed  Fax Number: on forms  Patient Notified form requests are processed in 3-5 business days: No  (If patient needs form sooner, please note that in this message.)  Okay to leave a detailed message? Yes

## 2021-05-30 NOTE — TELEPHONE ENCOUNTER
I am just receiving this paperwork today for the first time.  The challenge with completing the paperwork is that I have not seen him in a year and a half.  He has had recent hospitalizations and has seen other providers and I do not have an updated status on the patient and do not even know what current medications he is actually taking.  It appears that he is on my schedule for Friday and I therefore will keep the paperwork as my desk until then if that is okay.  In the event that is not please provide a detailed message as to what is needed in the short-term until I see him.  I signed the paperwork but need to update medications and potential orders.

## 2021-05-30 NOTE — PATIENT INSTRUCTIONS - HE
Stop aspirin given the risk of bleeding  Continue to monitor your blood pressure  If your blood pressure is above 150/90 then I will recommend a blood pressure medication  You may take tylenol as needed

## 2021-05-31 VITALS — WEIGHT: 157.9 LBS | BODY MASS INDEX: 22.1 KG/M2 | HEIGHT: 71 IN

## 2021-05-31 NOTE — PROGRESS NOTES
Assessment/ Plan     1. Essential hypertension    Reviewed his elevated blood pressure readings today  His goal blood pressure less than 150/90  Patient will get multiple blood pressure readings and will consider a low-dose antihypertensive medication  However, have reviewed side effects    2. Other hyperlipidemia    Can check a lipid cascade in the future    3. Glaucoma, unspecified glaucoma type, unspecified laterality    Continue to follow-up with ophthalmology    4.  Acute sepsis, resolved    Reviewed recent hospitalization and discharge summary  His urinary retention has resolved as well  Follow-up with urology as needed    Healthcare maintenance    Paperwork was completed for moving into assisted living  Aspirin was discontinued given risk of gastrointestinal bleeding  Recommend follow-up as advised  Patient verbalized understanding and agrees to the plan    Post Discharge Medication Reconciliation Status: medication reconciliation previously completed during another office visit      25 minutes were spent with the patient and greater than 50% of the time was spent in face to face counseling and coordination of care      Subjective:       Gabriel Esposito is a 89 y.o. male who presents to the clinic as he needs paperwork completed to move into [facility] in assisted living.  His medical history is notable for hypertension, hyperlipidemia, and glaucoma.  He had a recent hospitalization from May 16 through May 17 of this year when he presented with bilateral hip pain.  He had a fever and was found to have acute sepsis.  He experienced urinary retention as well.  He initially was treated with vancomycin and meropenem.  This was switched to Rocephin and later Levaquin.  His infection did respond to treatment.  He has followed up with urology as he required placement of a Gupta catheter and the Gupta catheter was removed.  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  He also had an MRI of the lumbar spine that revealed ankylosing spondylitis but was negative for significant spinal stenosis or neuroforaminal narrowing.    In the past he was treated with a blood pressure medication which has been discontinued.  He believes his blood pressure has been stable.  He denies recent fever or chills.  He has continued to follow-up with ophthalmology for glaucoma.  He reportedly did get the first Shingrix vaccine but has declined getting the other.    As noted, he and his wife have lived independently but will be moving into an assisted living environment.  He states he will use an occasional walker.  He has not had recent falls.  Review of systems is negative for headache, dizzy, chest pain or shortness of breath, palpitations, or other specific concerns.    He prefers to remain full code at this point.    The following portions of the patient's history were reviewed and updated as appropriate: allergies, current medications, past family history, past medical history, past social history, past surgical history and problem list. Medications have been reconciled    Review of Systems   A 12 point comprehensive review of systems was negative except as noted.      Current Outpatient Medications   Medication Sig Dispense Refill     brimonidine (ALPHAGAN) 0.15 % ophthalmic solution Administer 1 drop to both eyes every morning.              dorzolamide-timolol (COSOPT) 22.3-6.8 mg/mL ophthalmic solution Administer 1 drop to both eyes every morning.              latanoprost (XALATAN) 0.005 % ophthalmic solution Administer 1 drop to both eyes bedtime.       loperamide (IMODIUM) 2 mg capsule Take 1 capsule (2 mg total) by mouth 3 (three) times a day as needed for diarrhea.  0     menthol-zinc oxide (CALMOSEPTINE) 0.44-20.6 % Oint ointment Apply 1 application topically 3 (three) times a day as needed (perianal erythema/irritation).   0     No current facility-administered medications for this visit.        Objective:      /72   Pulse 72   Temp 97.9  F (36.6  C) (Oral)   Resp 12   Wt 147 lb (66.7 kg)   BMI 20.79 kg/m        General appearance: alert, appears stated age and cooperative  Head: Normocephalic, without obvious abnormality, atraumatic  Eyes: conjunctivae/corneas clear. PERRL, EOM's intact.   Ears: normal TM's and external ear canals both ears  Nose: Nares normal. Septum midline. Mucosa normal. No drainage or sinus tenderness.  Throat: lips, mucosa, and tongue normal; teeth and gums normal  Neck: no adenopathy, supple, symmetrical, trachea midline   Back: symmetric, no curvature. ROM normal. No CVA tenderness.  Lungs: clear to auscultation bilaterally  Heart: regular rate and rhythm, S1, S2 normal, no murmur, click, rub or gallop  Abdomen: soft, non-tender  Extremities: extremities normal, atraumatic, no cyanosis or edema  Skin: Skin color, texture, turgor normal. No rashes or lesions  Lymph nodes: Cervical nodes normal.  Neurologic: Alert and oriented X 3.         No results found for this or any previous visit (from the past 168 hour(s)).       This note has been dictated using voice recognition software. Any grammatical or context distortions are unintentional and inherent to the software

## 2021-06-01 ENCOUNTER — RECORDS - HEALTHEAST (OUTPATIENT)
Dept: ADMINISTRATIVE | Facility: CLINIC | Age: 86
End: 2021-06-01

## 2021-06-01 NOTE — PROGRESS NOTES
Injection administered to patient in clinic today and was tolerated well.     I met with Gabriel Esposito at the request of Dr Grullon to recheck his blood pressure.  Blood pressure medications on the MAR were reviewed with patient.    Patient has taken all medications as per usual regimen: N/A  Patient reports tolerating them without any issues or concerns: N/A    Vitals:    09/26/19 1026 09/26/19 1032   BP: 154/60 150/58       After 5 minutes, the patient's blood pressure remained greater than or equal to 140/90.    Is the patient currently having any chest pain?  No  Does the patient currently have a headache?   No  Does the patient currently have any vision changes? No  Does the patient currently have any nausea? No  Does the patient currently have any abdominal pain? No    The previous encounter was reviewed.  The patient was discharged and the note will be sent to the provider for final review.

## 2021-06-02 VITALS — BODY MASS INDEX: 22.54 KG/M2 | WEIGHT: 161 LBS | HEIGHT: 71 IN

## 2021-06-03 VITALS — BODY MASS INDEX: 20.79 KG/M2 | WEIGHT: 147 LBS

## 2021-06-04 VITALS
DIASTOLIC BLOOD PRESSURE: 78 MMHG | OXYGEN SATURATION: 97 % | RESPIRATION RATE: 12 BRPM | BODY MASS INDEX: 21.08 KG/M2 | SYSTOLIC BLOOD PRESSURE: 178 MMHG | HEART RATE: 79 BPM | TEMPERATURE: 98.2 F | WEIGHT: 149 LBS

## 2021-06-05 VITALS
SYSTOLIC BLOOD PRESSURE: 186 MMHG | OXYGEN SATURATION: 98 % | DIASTOLIC BLOOD PRESSURE: 73 MMHG | TEMPERATURE: 97.8 F | HEART RATE: 98 BPM | RESPIRATION RATE: 12 BRPM

## 2021-06-05 VITALS
HEART RATE: 82 BPM | RESPIRATION RATE: 12 BRPM | TEMPERATURE: 98.1 F | SYSTOLIC BLOOD PRESSURE: 188 MMHG | OXYGEN SATURATION: 100 % | DIASTOLIC BLOOD PRESSURE: 79 MMHG

## 2021-06-05 VITALS
RESPIRATION RATE: 20 BRPM | HEIGHT: 71 IN | BODY MASS INDEX: 20.86 KG/M2 | WEIGHT: 149 LBS | DIASTOLIC BLOOD PRESSURE: 74 MMHG | OXYGEN SATURATION: 99 % | HEART RATE: 68 BPM | SYSTOLIC BLOOD PRESSURE: 158 MMHG | TEMPERATURE: 96.7 F

## 2021-06-05 VITALS — WEIGHT: 150.38 LBS | BODY MASS INDEX: 21.53 KG/M2 | HEIGHT: 70 IN

## 2021-06-08 NOTE — PROGRESS NOTES
Assessment/Plan:    Gabriel Esposito is a 89 y.o. male presenting for:    1. Hypertension   He will continue taking his enalapril for his blood pressure control. He will stop taking the atenolol entirely and update me in one to two weeks with his progress. I have requested that he come back to the clinic for a blood pressure check nurse only visit in two weeks. He will continue taking his pulse and let me know if he becomes tachycardic at all.  - enalapril (VASOTEC) 20 MG tablet; Take 1 tablet (20 mg total) by mouth daily.  Dispense: 90 tablet; Refill: 2        Medications Discontinued During This Encounter   Medication Reason     atenolol (TENORMIN) 25 MG tablet      enalapril (VASOTEC) 20 MG tablet Reorder           Chief Complaint:  Chief Complaint   Patient presents with     Medication Education Visit     Med check     Medication Refill       Subjective:   Gabriel Esposito   Is a very pleasant 89-year-old gentleman with a past medical history  Significant for hypertension presenting to the clinic today for a follow-up. I saw the patient approximately 2 weeks ago for a follow-up of a hospital visit for rectal bleeding. Please even hope for further details. Briefly the rectal bleeding was likely caused by manual disinfection by the patient due to constipation. His constipation is well controlled now. One of the main issues he'd been having in the hospital was bradycardia with heart rates particularly low while he was sleeping. They had  Recommended decreasing his atenolol and started him on amlodipine. As you can see by my previous note the patient did not decrease as had allow or start the new blood pressure medication.   At his recent visit his blood pressure was 118/58  And he did notice some  With the static type of symptoms. He cut his atenolol in half and is now following up for repeat check. He states that he still notes when he is laying down or sitting sometimes his pulse will be in the upper 40s or lower 50s.  "His orthostatic symptoms are somewhat improved. He is wondering what he should do with his medication today.      12 point review of systems completed and negative except for what has been described above    History   Smoking Status     Never Smoker   Smokeless Tobacco     Never Used       Current Outpatient Prescriptions   Medication Sig     aspirin 81 MG EC tablet Take 81 mg by mouth daily.     bisacodyl (DULCOLAX, BISACODYL,) 10 mg suppository Insert 1 suppository (10 mg total) into the rectum daily as needed (constipation).     brimonidine (ALPHAGAN) 0.15 % ophthalmic solution Administer 1 drop to the right eye 2 (two) times a day.     dorzolamide-timolol (COSOPT) 22.3-6.8 mg/mL ophthalmic solution Administer 1 drop to the right eye 2 (two) times a day.     enalapril (VASOTEC) 20 MG tablet Take 1 tablet (20 mg total) by mouth daily.     latanoprost (XALATAN) 0.005 % ophthalmic solution Administer 1 drop to both eyes bedtime.     multivitamin with minerals (THERA-M) 9 mg iron-400 mcg Tab tablet Take 1 tablet by mouth daily.     polyethylene glycol (MIRALAX) 17 gram packet Take 1 packet (17 g total) by mouth daily. Adjust dose as needed until you have one soft stool daily.     ranitidine (ZANTAC) 150 MG tablet Take 1 tablet (150 mg total) by mouth 2 (two) times a day.         Objective:  Vitals:    01/25/17 1052   BP: 124/68   Pulse: (!) 56   Resp: 16   Temp: 97.5  F (36.4  C)   TempSrc: Oral   Weight: 156 lb 3 oz (70.8 kg)   Height: 5' 10.5\" (1.791 m)       Vital signs reviewed and stable  General: No acute distress  Psych: Appropriate affect  HEENT: moist mucous membranes  Lymph: no cervical or supraclavicular lymphadenopathy  Cardiovascular:bradycardic,  regular rhythm with no murmur  Pulmonary: clear to auscultation bilaterally with no wheeze  Abdomen: soft, non tender, non distended with normo-active bowel sounds  Extremities: warm and well perfused with no edema  Skin: warm and dry with no rash         This " note has been dictated and transcribed using voice recognition software.   Any errors in transcription are unintentional and inherent to the software.

## 2021-06-11 NOTE — TELEPHONE ENCOUNTER
Attempted to call pt, no answer, mobile number below is incorrect, that is a HE number.  Will try pt again later.

## 2021-06-11 NOTE — TELEPHONE ENCOUNTER
New Appointment Needed  What is the reason for the visit:    Pre-Op Appt Request  When is the surgery? :  9/29/2020  Where is the surgery?:   [facility]  Who is the surgeon? :  Yaquelin  What type of surgery is being done?: Bladder Tumer  Provider Preference: Any available  How soon do you need to be seen?: Monday - [place] if possible   Waitlist offered?: Yes  Okay to leave a detailed message:  Yes

## 2021-06-11 NOTE — ANESTHESIA CARE TRANSFER NOTE
Last vitals:   Vitals:    09/29/20 0947   BP: (!) 192/81   Pulse: 78   Resp: 18   Temp: 36.8  C (98.3  F)   SpO2: 100%     Patient's level of consciousness is awake  Spontaneous respirations: yes  Maintains airway independently: yes  Dentition unchanged: yes  Oropharynx: oropharynx clear of all foreign objects    QCDR Measures:  ASA# 20 - Surgical Safety Checklist: WHO surgical safety checklist completed prior to induction    PQRS# 430 - Adult PONV Prevention: 4558F - Pt received => 2 anti-emetic agents (different classes) preop & intraop  ASA# 8 - Peds PONV Prevention: NA - Not pediatric patient, not GA or 2 or more risk factors NOT present  PQRS# 424 - [place]-op Temp Management: 4559F - At least one body temp DOCUMENTED => 35.5C or 95.9F within required timeframe  PQRS# 426 - PACU Transfer Protocol: - Transfer of care checklist used  ASA# 14 - Acute Post-op Pain: ASA14B - Patient did NOT experience pain >= 7 out of 10

## 2021-06-11 NOTE — PROGRESS NOTES
URGENT CARE VISIT:    SUBJECTIVE:    Gabriel Esposito is a 89 y.o. male who  presents today for a possible UTI. Symptoms of dysuria, urinary frequency and urinary hesitancy have been going on for 1 week(s). Symptoms are sudden in onset and Severe.  Patient denies fever, abdominal pain, back pain and vomiting. Patient had a catheter removed one week ago. Symptoms started shortly after. His urologist gave him 3 pills of some abx to take at once which helped but symptoms returned.    PMH:   Past Medical History:   Diagnosis Date     Arthritis      Benign bladder tumor      Cataract, right eye     both eyes, cataracts surgically removed     GERD (gastroesophageal reflux disease)      Glaucoma      Hemorrhoids      Hyperlipidemia      Hypertension      Prostatitis      Urgency of urination      Allergies: Patient has no known allergies.   Medications:   Current Outpatient Medications   Medication Sig Dispense Refill     brimonidine (ALPHAGAN) 0.15 % ophthalmic solution Administer 1 drop to both eyes every morning.              dorzolamide-timolol (COSOPT) 22.3-6.8 mg/mL ophthalmic solution Administer 1 drop to both eyes every morning.              latanoprost (XALATAN) 0.005 % ophthalmic solution Administer 1 drop to both eyes bedtime.       loperamide (IMODIUM) 2 mg capsule Take 1 capsule (2 mg total) by mouth 3 (three) times a day as needed for diarrhea.  0     menthol-zinc oxide (CALMOSEPTINE) 0.44-20.6 % Oint ointment Apply 1 application topically 3 (three) times a day as needed (perianal erythema/irritation).  0     sulfamethoxazole-trimethoprim (BACTRIM DS) 800-160 mg per tablet Take 1 tablet by mouth 2 (two) times a day for 7 days. 14 tablet 0     No current facility-administered medications for this visit.      Social History:   Social History     Tobacco Use     Smoking status: Never Smoker     Smokeless tobacco: Never Used   Substance Use Topics     Alcohol use: No       ROS:  Review of systems negative except as  stated above.    OBJECTIVE:  /78 (Patient Site: Right Arm, Patient Position: Sitting, Cuff Size: Adult Regular)   Pulse 79   Temp 98.2  F (36.8  C) (Oral)   Resp 12   Wt 149 lb (67.6 kg)   SpO2 97%   BMI 21.08 kg/m    GENERAL APPEARANCE: healthy, alert and no distress  RESP: lungs clear to auscultation - no rales, rhonchi or wheezes  CV: regular rates and rhythm, normal S1 S2, no murmur noted  ABDOMEN:  soft, nontender, no HSM or masses and bowel sounds normal  BACK: No CVA tenderness  SKIN: no suspicious lesions or rashes    Labs:    Results for orders placed or performed in visit on 09/09/20   Urinalysis-UC if Indicated   Result Value Ref Range    Color, UA Yellow Colorless, Yellow, Straw, Light Yellow    Clarity, UA Cloudy (!) Clear    Glucose, UA Negative Negative    Bilirubin, UA Negative Negative    Ketones, UA Negative Negative    Specific Gravity, UA 1.025 1.005 - 1.030    Blood, UA Moderate (!) Negative    pH, UA 5.0 5.0 - 8.0    Protein,  mg/dL (!) Negative mg/dL    Urobilinogen, UA 0.2 E.U./dL 0.2 E.U./dL, 1.0 E.U./dL    Nitrite, UA Positive (!) Negative    Leukocytes, UA Large (!) Negative    Bacteria, UA Few (!) None Seen hpf    RBC, UA 5-10 (!) None Seen, 0-2 hpf    WBC, UA >100 (!) None Seen, 0-5 hpf    Squam Epithel, UA 0-5 None Seen, 0-5 lpf    WBC Clumps Present (!) None Seen       ASSESSMENT:   1. Urinary tract infection associated with catheterization of urinary tract, unspecified indwelling urinary catheter type, initial encounter (H)  Urinalysis-UC if Indicated    Culture, Urine    sulfamethoxazole-trimethoprim (BACTRIM DS) 800-160 mg per tablet       PLAN:  Patient Instructions   Patient was educated on the natural course of condition  Take medication as directed. Side effects discussed. Conservative measures discussed including drinking fluids (water) and avoiding holding urine when there is urge to urinate. See your primary care provider if symptoms do not improve in 3  days. Seek emergency care if you develop severe abdominal/flank pain, or fever.     Patient verbalized understanding and is agreeable to plan. The patient was discharged ambulatory and in stable condition.    Jayla Best PA-C on 9/9/2020 at 11:33 AM

## 2021-06-11 NOTE — ANESTHESIA POSTPROCEDURE EVALUATION
Patient: Gabriel A Huna  Procedure(s):  CYSTOURETHROSCOPY WITH FULGURATION RESECTION OF BLADDER TUMOR  Anesthesia type: general    Patient location: PACU  Last vitals:   Vitals Value Taken Time   /84 9/29/2020  1:20 PM   Temp 36.4  C (97.5  F) 9/29/2020 12:20 PM   Pulse 66 9/29/2020  1:26 PM   Resp 20 9/29/2020  1:26 PM   SpO2 99 % 9/29/2020  1:26 PM   Vitals shown include unvalidated device data.  Post vital signs: stable  Level of consciousness: awake and responds to simple questions  Post-anesthesia pain: pain controlled  Post-anesthesia nausea and vomiting: no  Pulmonary: unassisted, return to baseline  Cardiovascular: stable and blood pressure at baseline  Hydration: adequate  Anesthetic events: no    QCDR Measures:  ASA# 11 - Beatrice-op Cardiac Arrest: ASA11B - Patient did NOT experience unanticipated cardiac arrest  ASA# 12 - Beatrice-op Mortality Rate: ASA12B - Patient did NOT die  ASA# 13 - PACU Re-Intubation Rate: ASA13B - Patient did NOT require a new airway mgmt  ASA# 10 - Composite Anes Safety: ASA10A - No serious adverse event    Additional Notes:

## 2021-06-11 NOTE — PROGRESS NOTES
"  Assessment & Plan:       1. Acute cystitis with hematuria  Urinalysis-UC if Indicated    Culture, Urine    cephalexin (KEFLEX) 500 MG capsule   2. Benign essential hypertension          Medical Decision Making  Patient presents with acute onset dysuria.  Urinalysis does show signs of a urinary tract infection.  UTI does appear recurrent at this time as he was recently treated on 9/9 with oral Bactrim.  Start patient on oral Keflex.  Minimal concern for pyelonephritis given no CVA tenderness and no fevers.  Patient was also found to have hypertension today.  He has no red flag symptoms of severe headache, vision changes, or also weakness.  Will have patient follow-up nonurgently with his primary care provider for blood pressure recheck in 1 to 2 weeks.  Patient has follow-up with urologist in 3 days we will recheck symptoms at that time.  Discussed signs of worsening symptoms and when to follow-up with PCP if no symptom improvement.      Patient Instructions   Analysis of your urine showed signs of a urinary tract infection.     Take the prescribed antibiotics for the entire course, even if symptoms improve.  You should expect improvement to begin in 24 hours. In the meantime, continue to drink plenty of fluids.  Recommend daily use of a probiotic while taking prescribed medication (a common brand is Culturelle, yogurt with \"active cultures\" are also appropriate).    Reasons to come back for re-evaluation:  - Develop a fever, back or flank pain, or nausea and vomiting  - If symptoms have not completely improved after 72 hours  - Recurrent symptoms within a few weeks after treatment has concluded          Subjective:       Gabriel Esposito is a 89 y.o. male here for evaluation of dysuria.  Onset of symptoms was 2 weeks ago.  Patient was seen on 9/9 at the [facility] and diagnosed with a urinary tract infection.  Patient was prescribed oral Bactrim.  He noted good relief while the medication, but symptoms never " completely resolved.  And they have not been worsening again over the last 2 to 3 days.  Symptoms include burning on urination and urinary frequency.  Patient otherwise denies fevers, nausea, vomiting, back pains, and abdominal pains.  Patient notes that he recently was catheterized secondary to a urinary tract infection that resulted in the patient being unable to urinate.  He has a follow-up appointment with [facility] in 3 days.    The following portions of the patient's history were reviewed and updated as appropriate: allergies, current medications and problem list.    Review of Systems  Pertinent items are noted in HPI.     Allergies  No Known Allergies    Family History   Problem Relation Age of Onset     Diabetes Mother        Social History     Socioeconomic History     Marital status:      Spouse name: None     Number of children: None     Years of education: None     Highest education level: None   Occupational History     None   Social Needs     Financial resource strain: None     Food insecurity     Worry: None     Inability: None     Transportation needs     Medical: None     Non-medical: None   Tobacco Use     Smoking status: Never Smoker     Smokeless tobacco: Never Used   Substance and Sexual Activity     Alcohol use: No     Drug use: No     Sexual activity: None   Lifestyle     Physical activity     Days per week: None     Minutes per session: None     Stress: None   Relationships     Social connections     Talks on phone: None     Gets together: None     Attends Congregational service: None     Active member of club or organization: None     Attends meetings of clubs or organizations: None     Relationship status: None     Intimate partner violence     Fear of current or ex partner: None     Emotionally abused: None     Physically abused: None     Forced sexual activity: None   Other Topics Concern     None   Social History Narrative     None         Objective:       BP (!) 192/87    Pulse 88   Resp 12   SpO2 99%   General appearance: alert, appears stated age, cooperative, no distress and non-toxic  Back: No CVA tenderness  Lungs: clear to auscultation bilaterally  Heart: regular rate and rhythm, S1, S2 normal, no murmur, click, rub or gallop  Abdomen: soft, non-tender; bowel sounds normal; no masses,  no organomegaly  Skin: Skin color, texture, turgor normal. No rashes or lesions     Lab Results    Recent Results (from the past 24 hour(s))   Urinalysis-UC if Indicated   Result Value Ref Range    Color, UA Porsche (!) Colorless, Yellow, Straw, Light Yellow    Clarity, UA Cloudy (!) Clear    Glucose, UA Negative Negative    Bilirubin, UA Negative Negative    Ketones, UA Negative Negative    Specific Gravity, UA 1.025 1.005 - 1.030    Blood, UA Large (!) Negative    pH, UA 5.0 5.0 - 8.0    Protein,  mg/dL (!) Negative mg/dL    Urobilinogen, UA 0.2 E.U./dL 0.2 E.U./dL, 1.0 E.U./dL    Nitrite, UA Negative Negative    Leukocytes, UA Moderate (!) Negative    Bacteria, UA Moderate (!) None Seen hpf    RBC, UA >100 (!) None Seen, 0-2 hpf    WBC, UA 10-25 (!) None Seen, 0-5 hpf    Squam Epithel, UA 0-5 None Seen, 0-5 lpf    Mucus, UA Moderate (!) None Seen lpf     I personally reviewed these results and discussed findings with the patient.

## 2021-06-11 NOTE — PROGRESS NOTES
Proposed Surgery/ Procedure: cystourethroscopy with fulgration resection bladder tumor  Date of Surgery/ Procedure: 09/29/20  Time of Surgery/ Procedure: 9:30 am check in 11:30 procedure  Hospital/Surgical Facility: [facility]  Surgery Fax Number: Note does not need to be faxed, will be available electronically in Epic.  Primary Physician: Hector Grullon MD  Type of Anesthesia Anticipated: to be determined    HPI related to upcoming procedure: 89 year old male, new to me, here today for preoperative examination.  Patient with history of benign bladder tumor, recently was having recurrent UTIs.  He recently had an appointment with urology, who did what sounds like cystoscopy and, per patient, saw a potential small tumor.  For this reason, patient is planning to have cystourethroscopy with fulgration resection bladder tumor.  Denies any symptoms such as fevers, chills, sweats, unexplained weight changes.  No other symptoms aside from recent symptoms associated with urinary tract infection.    Have you ever had a heart attack or stroke? No  Have you ever had surgery on your heart or blood vessels, such as a stent, coronary (heart) bypass, or surgery on an artery in the head, neck, heart, or legs? No  Do you have chest pain when you are physically active? No  Do you have a history of heart failure? No  Do you currently have a cold, bronchitis, or symptoms of other respiratory (head and chest) infections? No  Do you have a cough, shortness of breath, or wheezing? No  Do you or anyone in your family have a history of blood clots? No  Do you or anyone in your family have a serious bleeding problem, such as long-lasting bleeding after surgeries or cuts? No  Have you ever had anemia or been told to take iron pills? No  Have you had any abnormal blood loss such as black, tarry or bloody stools, or abnormal vaginal bleeding?No  Have you ever had a blood transfusion? No   Are you willing to have a blood transfusion if  it is medically needed before, during, or after your surgery? Yes  Have you or anyone in your family ever had problems with anesthesia (sedation for surgery)? No  Do you have sleep apnea, excessive snoring, or daytime drowsiness? YES tired due to age  Do you have a CPAP machine? NO: none  Do you have any artifical heart valves or other implanted medical devices, such as a pacemaker, defibrillator, or continuous glucose monitor? No  Do you have any artifical joints? No  Are you allergic to latex? No  Is there any chance that you may be pregnant? No    Patient has a Health Care Directive or Living Will:  Yes     [facility]  [address], [address]  [facility] [address]  Dept: [phone]  Dept Fax: [phone]  Primary Provider: Hector Grullon MD  Pre-op Performing Provider: JESSICA RODARTE    PREOPERATIVE EVALUATION:  Today's date: 9/28/2020    Gabriel Esposito is a 89 y.o. male who presents for a preoperative evaluation.    Surgical Information:  No flowsheet data found.  Fax number for surgical facility: Note does not need to be faxed, will be available electronically in Epic.  Type of Anesthesia Anticipated: to be determined    Subjective     HPI related to upcoming procedure: see above.  No flowsheet data found.      Patient does not have a Health Care Directive or Living Will: Patient states has Advance Directive and will bring in a copy to clinic.-His wife would speak on his behalf if needed    RX [place] ([place] reviewed:  reviewed - no concerns    See problem list for active medical problems.  Problems all longstanding and stable, except as noted/documented.  See ROS for pertinent symptoms related to these conditions.      Review of Systems  CONSTITUTIONAL: NEGATIVE for fever, chills, change in weight  INTEGUMENTARY/SKIN: NEGATIVE for worrisome rashes, moles or lesions  EYES: NEGATIVE for vision changes or irritation  ENT/MOUTH: NEGATIVE for ear, mouth and throat  problems  RESP: NEGATIVE for significant cough or SOB  BREAST: NEGATIVE for masses, tenderness or discharge  CV: NEGATIVE for chest pain, palpitations or peripheral edema  GI: NEGATIVE for nausea, abdominal pain, heartburn, or change in bowel habits  : NEGATIVE for frequency, dysuria, or hematuria  MUSCULOSKELETAL: NEGATIVE for significant arthralgias or myalgia  NEURO: NEGATIVE for weakness, dizziness or paresthesias  ENDOCRINE: NEGATIVE for temperature intolerance, skin/hair changes  HEME: NEGATIVE for bleeding problems  PSYCHIATRIC: NEGATIVE for changes in mood or affect    Patient Active Problem List    Diagnosis Date Noted     Hypertension 01/23/2018     Sinus bradycardia      Unspecified glaucoma      Other hyperlipidemia      Cataract      Prediabetes      Past Medical History:   Diagnosis Date     Arthritis      Benign bladder tumor      GERD (gastroesophageal reflux disease)      Glaucoma      Hemorrhoids      Hyperlipidemia      Hypertension      Prostatitis      Urgency of urination      Past Surgical History:   Procedure Laterality Date     BLADDER SURGERY      removal of benign tumor     EYE SURGERY Bilateral     Cataract     MI HEMORRHOIDECTOMY INTERNAL RUBBER BAND LIGATIONS      Description: Hemorrhoidectomy;  Recorded: 04/09/2010;     Current Outpatient Medications   Medication Sig Dispense Refill     brimonidine (ALPHAGAN) 0.15 % ophthalmic solution Administer 1 drop to both eyes every morning.              dorzolamide-timolol (COSOPT) 22.3-6.8 mg/mL ophthalmic solution Administer 1 drop to both eyes every morning.              latanoprost (XALATAN) 0.005 % ophthalmic solution Administer 1 drop to both eyes bedtime.       loperamide (IMODIUM) 2 mg capsule Take 1 capsule (2 mg total) by mouth 3 (three) times a day as needed for diarrhea.  0     sulfamethoxazole-trimethoprim (SEPTRA DS) 800-160 mg per tablet Take 1 tablet by mouth 2 (two) times a day for 10 days. 20 tablet 0     menthol-zinc oxide  "(CALMOSEPTINE) 0.44-20.6 % Oint ointment Apply 1 application topically 3 (three) times a day as needed (perianal erythema/irritation).  0     No current facility-administered medications for this visit.        No Known Allergies    Social History     Tobacco Use     Smoking status: Former Smoker     Smokeless tobacco: Never Used     Tobacco comment: quit smoking in 1983   Substance Use Topics     Alcohol use: No      Family History   Problem Relation Age of Onset     Diabetes Mother      Social History     Substance and Sexual Activity   Drug Use No           Objective   /74 (Patient Site: Right Arm, Patient Position: Sitting, Cuff Size: Adult Regular)   Pulse 68   Temp 96.7  F (35.9  C) (Tympanic)   Resp 20   Ht 5' 10.5\" (1.791 m)   Wt 149 lb (67.6 kg)   SpO2 99%   BMI 21.08 kg/m    Physical Exam    GENERAL APPEARANCE: healthy, alert and no distress     EYES: EOMI,  PERRL     HENT: ear canals and TM's normal and nose and mouth without ulcers or lesions     NECK: no adenopathy, no asymmetry, masses, or scars and thyroid normal to palpation     RESP: lungs clear to auscultation - no rales, rhonchi or wheezes     CV: regular rates and rhythm, normal S1 S2, no S3 or S4 and no murmur, click or rub     ABDOMEN:  soft, nontender, no HSM or masses and bowel sounds normal     MS: extremities normal- no gross deformities noted, no evidence of inflammation in joints, FROM in all extremities.     SKIN: no suspicious lesions or rashes     NEURO: Normal strength and tone, sensory exam grossly normal, mentation intact and speech normal     PSYCH: mentation appears normal. and affect normal/bright     LYMPHATICS: No cervical adenopathy    Recent Labs   Lab Test 09/28/20  1011 05/13/19  0702 05/12/19  0851 05/11/19  0535  05/08/19  0534   HGB 14.7  --  12.7* 11.5*   < > 12.1*   PLT  --   --  206 161   < > 116*   INR  --   --   --   --   --  1.54*     --  141 140   < > 139   K 4.0 4.1 4.1 4.0   < > 4.2 "   CREATININE 1.18  --  0.81 0.79   < > 0.95    < > = values in this interval not displayed.        PRE-OP Diagnostics:   Recent Results (from the past 24 hour(s))   Hemoglobin    Collection Time: 09/28/20 10:11 AM   Result Value Ref Range    Hemoglobin 14.7 14.0 - 18.0 g/dL   Basic Metabolic Panel    Collection Time: 09/28/20 10:11 AM   Result Value Ref Range    Sodium 141 136 - 145 mmol/L    Potassium 4.0 3.5 - 5.0 mmol/L    Chloride 104 98 - 107 mmol/L    CO2 25 22 - 31 mmol/L    Anion Gap, Calculation 12 5 - 18 mmol/L    Glucose 119 70 - 125 mg/dL    Calcium 9.2 8.5 - 10.5 mg/dL    BUN 16 8 - 28 mg/dL    Creatinine 1.18 0.70 - 1.30 mg/dL    GFR MDRD Af Amer >60 >60 mL/min/1.73m2    GFR MDRD Non Af Amer 57 (L) >60 mL/min/1.73m2     No EKG required, no history of coronary heart disease, significant arrhythmia, peripheral arterial disease or other structural heart disease.         Assessment & Plan       The proposed surgical procedure is considered INTERMEDIATE risk.    REVISED CARDIAC RISK INDEX   The patient has the following serious cardiovascular risks for perioperative complications:  No serious cardiac risks = 0 points    INTERPRETATION: 0 points: Class I (very low risk - 0.4% complication rate)    1. Preoperative examination  - Hemoglobin  - Basic Metabolic Panel    2. Recurrent UTI  Due to the recent recurrent UTIs, was seen by urology, and per patient, noted to have possible bladder tumor seen on cystoscopy.  We will plan for procedure as noted above.  -Recommend patient complete entire course of Septra.    3. Elevated blood pressure reading  Blood pressure is elevated above his goal of 150/90 today.  He is not currently taking any antihypertensives, recommend he follow-up with PCP to recheck blood pressure after surgery.    4. Glaucoma of both eyes, unspecified glaucoma type  Followed by ophthalmology.      The patient has the following additional risks and recommendations for perioperative  complications:     - No identified additional risk factors other than previously addressed     MEDICATION INSTRUCTIONS:  Patient is to take all scheduled medications on the day of surgery    RECOMMENDATION:  APPROVAL GIVEN to proceed with proposed procedure, without further diagnostic evaluation.    No follow-ups on file.    Signed Electronically by: Delma Mitchell MD    Copy of this evaluation report is provided to requesting physician.    [facility] Preop Guidelines    Revised Cardiac Risk Index

## 2021-06-12 NOTE — TELEPHONE ENCOUNTER
Wife calling about .  Bladder surgery last week.    Bleeding and suppose to be taking antibiotic medicine.    Blood is in his urine as he is using catheter.    Urologist not responding to calls.    He will not take the antibiotic medication because when he took it Saturday the bleeding started in his urine.    Only taking tylenol. Wife will not give him narcotic.  Nurse tried to tall her it is ok to give if needed.  Not addictive until few refills of continuous use/abuse..  He may need this for healing.  She still will not give it.    She is requesting Dr. Grullon call her home phone preferred.    Kristan Torres RN [facility] Triage Nurse Advisor                Reason for Disposition    Sounds like a serious complication to the triager    Additional Information    Negative: Sounds like a life-threatening emergency to the triager    Negative: Chest pain    Negative: Difficulty breathing    Negative: Surgical incision symptoms and questions    Negative: [1] Discomfort (pain, burning or stinging) when passing urine AND [2] male    Negative: [1] Discomfort (pain, burning or stinging) when passing urine AND [2] female    Negative: Constipation    Negative: New or worsening leg (calf, thigh) pain    Negative: New or worsening leg swelling    Negative: Dizziness is severe, or persists > 24 hours after surgery    Negative: Pain, redness, swelling, or pus at IV Site    Negative: Symptoms arising from use of a urinary catheter (Gupta or Coude)    Negative: Cast problems or questions    Negative: Medication question    Negative: [1] Widespread rash AND [2] bright red, sunburn-like    Protocols used: POST-OP SYMPTOMS AND [id number]

## 2021-06-13 NOTE — TELEPHONE ENCOUNTER
Called and review. Art is following with urology for bladder cancer. He is established with [facility] in [place] for proximity now that the [facility] is closed.

## 2021-06-13 NOTE — TELEPHONE ENCOUNTER
Who is calling:  Patient's wife, Anali  Reason for Call:  Caller stated she was told to let Hector Grullon MD know that the patient is at [facility] and has been there since 11/7/20. Caller stated the patient was hemorrhaging from his bladder. Caller stated she would like a call back from Hector Grullon MD to further discuss what is currently happening at the hospital. The caller was speaking in circles and was not really listening to the writer. The caller asked several questions about where Hector Grullon MD would be but did not allow the writer to answer the questions. The writer had to ask the caller to slow down and listen so that her questions could be answered. The caller stated to let Hector Grullon MD know about the patient's status but then asked for a call back.  Date of last appointment with primary care: 9/28/20  Okay to leave a detailed message: No

## 2021-06-14 NOTE — PROGRESS NOTES
Chief Complaint   Patient presents with     Urinary Tract Infection     Believes he has a bladder infection      HPI: Gabriel Esposito is a 89 y.o. male with history of hypertension, prediabetes, and urothelial carcinoma who presents for evaluation of dysuria and urinary frequency onset 1 week ago. Pt has a recent hx of UTIs. He had surgical resection of bladder cancer in September. He had a catheter at the time. He states that when it was removed, they forgot to deflate the balloon and he has had UTI and voiding issues since. He is followed by [facility], and is currently receiving weekly BCG treatments. He missed last week due to inclement weather. He is due for his next treatment on 12/30.  No treatments tried. No known alleviating or aggravating factors. Symptoms are moderate in severity & constant in duration. Patient reports no blood in urine, perineal pain, fever/chills,  abdominal pain, flank pain, nausea/vomiting, or any other symptoms.    Problem List:  2020-10: Urothelial carcinoma (H)  2020-09: Hematuria  2019-05: Severe sepsis (H)  2018-01: Hypertension  2016-12: Rectal bleeding  Unspecified glaucoma  Lower Back Pain  Diarrhea  Other hyperlipidemia  Cataract  Hypertension, benign essential, goal below 140/90  Prediabetes  Sinus bradycardia  Abnormal finding in urine      Past Medical History:   Diagnosis Date     Arthritis      Benign bladder tumor      GERD (gastroesophageal reflux disease)      Glaucoma      Hemorrhoids      Hyperlipidemia      Hypertension      Prostatitis      Urgency of urination        Past Surgical History:   Procedure Laterality Date     BLADDER SURGERY      removal of benign tumor     CYSTOSCOPY N/A 9/29/2020    Procedure: CYSTOURETHROSCOPY WITH FULGURATION RESECTION OF BLADDER TUMOR;  Surgeon: Alejandro Mcdonough MD;  Location: [facility];  Service: Urology     CYSTOURETHROSCOPY  09/29/2020     EYE SURGERY Bilateral     Cataract     MI HEMORRHOIDECTOMY INTERNAL RUBBER  BAND LIGATIONS      Description: Hemorrhoidectomy;  Recorded: 04/09/2010;     Social History     Tobacco Use     Smoking status: Former Smoker     Smokeless tobacco: Never Used     Tobacco comment: quit smoking in 1983   Substance Use Topics     Alcohol use: No       Review of Systems   Constitutional: Negative for chills and fever.   Respiratory: Negative for shortness of breath.    Cardiovascular: Negative for chest pain.   Gastrointestinal: Negative for diarrhea, nausea and vomiting.   Genitourinary: Positive for dysuria and frequency. Negative for flank pain and hematuria.   All other systems reviewed and are negative.      Vitals:    12/28/20 0959   BP: (!) 186/73   Patient Site: Right Arm   Patient Position: Sitting   Cuff Size: Adult Regular   Pulse: 98   Resp: 12   Temp: 97.8  F (36.6  C)   TempSrc: Oral   SpO2: 98%       Physical Exam   Constitutional: He appears well-developed and well-nourished.  Non-toxic appearance.   HENT:   Head: Normocephalic and atraumatic.   Cardiovascular: Normal rate, regular rhythm and normal heart sounds.   Pulmonary/Chest: Effort normal and breath sounds normal.   Abdominal: Normal appearance. He exhibits no mass. There is no rigidity, no guarding and no CVA tenderness.   Neurological: He is alert.   Skin: Skin is warm and dry.   Psychiatric: He has a normal mood and affect.         Labs:  Recent Results (from the past 24 hour(s))   Urinalysis-UC if Indicated   Result Value Ref Range    Color, UA Yellow Colorless, Yellow, Straw, Light Yellow    Clarity, UA Cloudy (!) Clear    Glucose, UA Negative Negative    Bilirubin, UA Negative Negative    Ketones, UA Negative Negative    Specific Gravity, UA 1.025 1.005 - 1.030    Blood, UA Moderate (!) Negative    pH, UA 5.5 5.0 - 8.0    Protein, UA 30 mg/dL (!) Negative mg/dL    Urobilinogen, UA 0.2 E.U./dL 0.2 E.U./dL, 1.0 E.U./dL    Nitrite, UA Negative Negative    Leukocytes, UA Small (!) Negative    Bacteria, UA Moderate (!) None  Seen hpf    RBC, UA 3-5 (!) None Seen, 0-2 hpf    WBC, UA  (!) None Seen, 0-5 hpf    Squam Epithel, UA 0-5 None Seen, 0-5 lpf       Radiology:  No results found.        Clinical Decision Making:    Urinalysis consistent with UTI; patient afebrile and no s/sx of pyelonephritis. No perineal pain or systemic symptoms to suggest prostatitis. No difficulty passing urine to suggest obstruction. Will prescribe antibiotic. Culture pending. Contact [facility] to ask about rescheduling BCG.    BP elevated today, asymptomatic. F/u with PCP.  See patient instructions below.  At the end of the encounter, I discussed results, diagnosis, medications. Discussed red flags for immediate return to clinic/ER, as well as indications for follow up if no improvement. Patient understood and agreed to plan. Patient was stable for discharge.    1. Acute cystitis with hematuria  Urinalysis-UC if Indicated    Culture, Urine    sulfamethoxazole-trimethoprim (BACTRIM DS) 800-160 mg per tablet   2. Urothelial carcinoma (H)     3. Hypertension, unspecified type           Return in about 3 days (around 12/31/2020) for Follow up w/ primary care provider if not improved.    ELEAZAR Middleton, PAED  [facility]    Patient Instructions   1. Increase fluid intake  2. Complete antibiotic regimen as prescribed. You will be notified if the treatment plan needs to be changed based on the urine culture results.   3. For the discomfort: You may take an over the counter medication called pyridium (AZO) up three times daily for up to 2 days.  4. Follow up if you have a fever of 100.4 F (38 C) or higher, no improvement after three days of treatment, trouble urinating because of pain, rash or joint pain, Increased back or abdominal pain.    Contact [facility] to see if you need to reschedule your BCG.

## 2021-06-14 NOTE — PROGRESS NOTES
Please call patient know let him know that his urine grew Enterococcus faecalis sensitive to ampicillin. I will send a new antibiotic for him to take--amoxicillin 500mg three times day for the next 5 days. Follow up if symptoms worsens or fail to improve.    Called patient and sent prescription to pharmacy in [place].      Acute cystitis with hematuria  -     amoxicillin (AMOXIL) 500 MG capsule; Take 1 capsule (500 mg total) by mouth 3 (three) times a day for 5 days.        Medhat Pamela  1/1/2021

## 2021-06-14 NOTE — PATIENT INSTRUCTIONS - HE
1. Increase fluid intake  2. Complete antibiotic regimen as prescribed. You will be notified if the treatment plan needs to be changed based on the urine culture results.   3. For the discomfort: You may take an over the counter medication called pyridium (AZO) up three times daily for up to 2 days.  4. Follow up if you have a fever of 100.4 F (38 C) or higher, no improvement after three days of treatment, trouble urinating because of pain, rash or joint pain, Increased back or abdominal pain.    Contact [facility] to see if you need to reschedule your BCG.

## 2021-06-15 NOTE — PROGRESS NOTES
Assessment/ Plan     1. Hypertension  Lightheadedness, may be secondary to enalapril    He reveals normal sinus rhythm with a nonspecific ST abnormality  The ECG was reviewed by me and will be reviewed by cardiology  - Electrocardiogram Perform and Read    Discontinue enalapril  Will not resume atenolol as he had bradycardia in the past  Recommend amlodipine  Reviewed side effects  Follow-up to recheck blood pressure  Recommend that he notify the clinic if experiencing significant side effects    Also, favor checking laboratory testing including a thyroid cascade, basic metabolic panel, and hemogram with platelets in the near future    2. Palpitations    The ECG revealed normal sinus rhythm as noted  - Electrocardiogram Perform and Read  If there are concerns about irregular heartbeat can consider a Holter monitor in the future  Consider a pharmacologic induced stress test if experiencing problems with exercise tolerance or chest pain    Patient agrees with the above plan    25 minutes were spent with the patient and greater than 50% of the time was spent in face to face counseling and coordination of care        Subjective:       Gabriel Esposito is a 89 y.o. male who presents to the clinic in follow-up for blood pressure.  He has a history of hypertension and has been taking enalapril.  A primary concern is that he has been experiencing some lightheadedness when standing up he gets more lightheaded.  At the end of 2016 he had a hospitalization for passage of bright red blood per rectum.  At that time he had been significantly constipated and had attempted manual disimpaction.  A flexible sigmoidoscopy was recommended but declined at that time.  His constipation has not been bothering him recently.  He has not had passage of bright red blood per rectum.  He does note that he can be lightheaded as noted.  He can have a vague chest discomfort at times.  It should be noted that during the course of his hospitalization in  2016 he did experience some bradycardia and atenolol was discontinued.  He was treated with amlodipine at one point but switched to enalapril.  He denies ongoing chest pain.  He has not had orthopnea or paroxysmal nocturnal dyspnea.  He and his wife continue to live independently.    He has medical history is otherwise notable for hyperlipidemia and prediabetes. He has a history of glaucoma, and continues to follow up with ophthalmology.     The following portions of the patient's history were reviewed and updated as appropriate: allergies, current medications, past family history, past medical history, past social history, past surgical history and problem list.    Review of Systems   A 12 point comprehensive review of systems was negative except as noted.      Current Outpatient Prescriptions   Medication Sig Dispense Refill     aspirin 81 MG EC tablet Take 81 mg by mouth daily.       brimonidine (ALPHAGAN) 0.15 % ophthalmic solution Administer 1 drop to the right eye 2 (two) times a day.       dorzolamide-timolol (COSOPT) 22.3-6.8 mg/mL ophthalmic solution Administer 1 drop to the right eye 2 (two) times a day.       enalapril (VASOTEC) 20 MG tablet TAKE ONE TABLET BY MOUTH ONCE DAILY 90 tablet 0     latanoprost (XALATAN) 0.005 % ophthalmic solution Administer 1 drop to both eyes bedtime.       multivitamin with minerals (THERA-M) 9 mg iron-400 mcg Tab tablet Take 1 tablet by mouth daily.       ranitidine (ZANTAC) 150 MG tablet Take 1 tablet (150 mg total) by mouth 2 (two) times a day. 60 tablet 2     amLODIPine (NORVASC) 5 MG tablet Take 1 tablet (5 mg total) by mouth daily. 30 tablet 1     bisacodyl (DULCOLAX, BISACODYL,) 10 mg suppository Insert 1 suppository (10 mg total) into the rectum daily as needed (constipation). 12 suppository 0     polyethylene glycol (MIRALAX) 17 gram packet Take 1 packet (17 g total) by mouth daily. Adjust dose as needed until you have one soft stool daily. 30 each 0     No current  "facility-administered medications for this visit.        Objective:      /64  Pulse (!) 102  Ht 5' 10.5\" (1.791 m)  Wt 157 lb 14.4 oz (71.6 kg)  BMI 22.34 kg/m2      General appearance: alert, appears stated age and cooperative  Head: Normocephalic, without obvious abnormality, atraumatic  Eyes: conjunctivae/corneas clear. PERRL, EOM's intact.   Ears: normal TM's and external ear canals both ears  Nose: Nares normal. Septum midline. Mucosa normal. No drainage or sinus tenderness.  Throat: lips, mucosa, and tongue normal; teeth and gums normal  Neck: no adenopathy, supple, symmetrical, trachea midline and thyroid not enlarged, symmetric  No carotid bruits  Lungs: clear to auscultation bilaterally  Heart: regular rate and rhythm, S1, S2 normal, no murmur, click, rub or gallop  Abdomen: soft, non-tender  Extremities: extremities normal, atraumatic, no cyanosis or edema  Skin: Skin color, texture, turgor normal. No rashes or lesions  Lymph nodes: Cervical nodes normal.  Neurologic: Alert and oriented X 3. Normal coordination and gait         No results found for this or any previous visit (from the past 168 hour(s)).       This note has been dictated using voice recognition software. Any grammatical or context distortions are unintentional and inherent to the software  "

## 2021-06-18 NOTE — PATIENT INSTRUCTIONS - HE
Patient Instructions by Zabrina Smith CNP at 9/25/2020 10:20 AM     Author: Zabrina Smith CNP Service: -- Author Type: Nurse Practitioner    Filed: 9/25/2020 11:21 AM Encounter Date: 9/25/2020 Status: Signed    : Zabrina Smith CNP (Nurse Practitioner)         Patient Education     Blood in the Urine    Blood in the urine (hematuria) has many possible causes. If it occurs after an injury (such as a car accident or fall), it is most often a sign of bruising to the kidney or bladder. Common causes of blood in the urine include urinary tract infections, kidney stones, inflammation, tumors, or certain other diseases of the kidney or bladder. Menstruation can cause blood to appear in the urine sample, although it is not coming from the urinary tract.  If only a trace amount of blood is present, it will show up on the urine test, even though the urine may be yellow and not pink or red. This may occur with any of the above conditions, as well as heavy exercise or high fever. In this case, your doctor may want to repeat the urine test on another day. This will show if the blood is still present. If it is, then other tests can be done to find out the cause.  Home care  Follow these home care guidelines:    If your urine does not appear bloody (pink, brown or red) then you do not need to restrict your activity in any way.    If you can see blood in your urine, rest and avoid heavy exertion until your next exam. Do not use aspirin, blood thinners, or anti-platelet or anti-inflammatory medicines. These include ibuprofen and naproxen. These thin the blood and may increase bleeding.  Follow-up care  Follow up with your healthcare provider, or as advised. If you were injured and had blood in your urine, you should have a repeat urine test in 1 to 2 days. Contact your doctor for this test.  A radiologist will review any X-rays that were taken. You will be told of any new findings that may affect your care.  When  to seek medical advice  Call your healthcare provider right away if any of these occur:    Bright red blood or blood clots in the urine (if you did not have this before)    Weakness, dizziness or fainting    New groin, abdominal, or back pain    Fever of 100.4 F (38 C) or higher, or as directed by your healthcare provider    Repeated vomiting    Bleeding from the nose or gums or easy bruising  Date Last Reviewed: 9/1/2016 2000-2017 [employer]. [address] [place] [address]. All rights reserved. This information is not intended as a substitute for professional medical care. Always follow your healthcare professional's instructions.

## 2021-06-18 NOTE — PROGRESS NOTES
I met with Gabriel Esposito at the request of Dr. Grullon to recheck his blood pressure.  Blood pressure medications on the MAR were reviewed with patient.    Patient has taken all medications as per usual regimen: No - He discontinued all his BP meds  Patient reports tolerating them without any issues or concerns: Yes    Vitals:    06/12/18 1000   BP: 134/70       Blood pressure was taken, previous encounter was reviewed, recorded blood pressure below 140/90.  Patient was discharged and the note will be sent to the provider for final review.

## 2021-06-20 NOTE — PROGRESS NOTES
Assessment/ Plan     1. Strep pharyngitis  Patient was positive for strep today.  We will get him started on penicillin 500 mg 3 times daily for the next 10 days.  Discussed symptomatic cares.  Discussed that he is contagious for the next 24 hours and then should be fine.  His wife is throwing him a surprise birthday party in 2 days.  He should be feeling better by then.  Discussed potential side effects of the antibiotics including GI upset and diarrhea.  Patient follow-up early next week if symptoms are not improving.  - Rapid Strep A Screen- Throat Swab  - penicillin VK (PEN VK) 500 MG tablet; Take 1 tablet (500 mg total) by mouth 3 (three) times a day for 10 days.  Dispense: 30 tablet; Refill: 0      Subjective:       Gabriel Esposito is a 89 y.o. male who presents for evaluation of sore throat.  Patient has had a sore throat now for about 3 days.  He has had a little bit of laryngitis.  He states it hurts to cough and it hurts to swallow.  He has had no fevers.  He denies runny nose but has had a little bit of a cough.  He denies any exposures.  Is not around any children.  The only thing he can think about is he was out to dinner at a restaurant the day before symptoms started.  He states a person at the table next 1 coughed in his direction.  Not sure that would be enough of an exposure.  His wife is throwing him a surprise birthday party in 2 days.  He has no history of smoking and no lung disorders.  He has no known allergies.    Relevant past medical, family, surgical, and social history reviewed with patient, unless noted in HPI, not pertinent for this visit.    Review of Systems   A 12 point comprehensive review of systems was negative except as noted.      Current Outpatient Prescriptions   Medication Sig Dispense Refill     aspirin 81 MG EC tablet Take 81 mg by mouth daily.       brimonidine (ALPHAGAN) 0.15 % ophthalmic solution Administer 1 drop to the right eye 2 (two) times a day.        "dorzolamide-timolol (COSOPT) 22.3-6.8 mg/mL ophthalmic solution Administer 1 drop to the right eye 2 (two) times a day.       latanoprost (XALATAN) 0.005 % ophthalmic solution Administer 1 drop to both eyes bedtime.       multivitamin with minerals (THERA-M) 9 mg iron-400 mcg Tab tablet Take 1 tablet by mouth daily.       penicillin VK (PEN VK) 500 MG tablet Take 1 tablet (500 mg total) by mouth 3 (three) times a day for 10 days. 30 tablet 0     No current facility-administered medications for this visit.        Objective:      /80  Pulse 72  Temp 97.7  F (36.5  C) (Oral)   Resp 14  Ht 5' 10.5\" (1.791 m)  Wt 161 lb (73 kg)  BMI 22.77 kg/m2      General appearance: alert, appears stated age and cooperative  Head: Normocephalic, without obvious abnormality, atraumatic  Eyes: conjunctivae/corneas clear.  Ears: normal TM's and external ear canals both ears  Nose: Nares normal. Septum midline. Mucosa normal. No drainage or sinus tenderness.  Throat: Oropharynx is mildly erythematous, no exudates.  Neck: no adenopathy  Lungs: clear to auscultation bilaterally  Heart: regular rate and rhythm, S1, S2 normal, no murmur, click, rub or gallop      Recent Results (from the past 168 hour(s))   Rapid Strep A Screen- Throat Swab   Result Value Ref Range    Rapid Strep A Antigen Group A Strep detected (!) No Group A Strep detected, presumptive negative          This note has been dictated using voice recognition software. Any grammatical or context distortions are unintentional and inherent to the software  "

## 2021-06-20 NOTE — LETTER
Letter by Delma Mitchell MD at      Author: Delma Mitchell MD Service: -- Author Type: --    Filed:  Encounter Date: 9/29/2020 Status: (Other)         Gabriel Esposito  [address] [facility] [address]             September 29, 2020         Dear Mr. Esposito,    Below are the results from your recent visit:    Resulted Orders   Hemoglobin   Result Value Ref Range    Hemoglobin 14.7 14.0 - 18.0 g/dL   Basic Metabolic Panel   Result Value Ref Range    Sodium 141 136 - 145 mmol/L    Potassium 4.0 3.5 - 5.0 mmol/L    Chloride 104 98 - 107 mmol/L    CO2 25 22 - 31 mmol/L    Anion Gap, Calculation 12 5 - 18 mmol/L    Glucose 119 70 - 125 mg/dL    Calcium 9.2 8.5 - 10.5 mg/dL    BUN 16 8 - 28 mg/dL    Creatinine 1.18 0.70 - 1.30 mg/dL    GFR MDRD Af Amer >60 >60 mL/min/1.73m2    GFR MDRD Non Af Amer 57 (L) >60 mL/min/1.73m2    Narrative    Fasting Glucose reference range is 70-99 mg/dL per  American Diabetes Association (ADA) guidelines.       Labs look ok, ok for surgery.  I hope things went well.        Please call with questions or contact us using [employer].    Sincerely,        Electronically signed by Delma Mitchell MD

## 2021-06-29 NOTE — PROGRESS NOTES
Progress Notes by Zabrina Smith CNP at 9/25/2020 10:20 AM     Author: Zabrina Smith CNP Service: -- Author Type: Nurse Practitioner    Filed: 9/25/2020 12:35 PM Encounter Date: 9/25/2020 Status: Signed    : Zabrina Smith CNP (Nurse Practitioner)       Chief Complaint   Patient presents with   ? Urinary Tract Infection     Finished meds yesterday wants to be retested to make sure infection is gone       HPI:  Gabriel Esposito is a 89 y.o. male who presents today following recent treatment for a acute cystitis with hematuria on 9/18/2020. Patient was treated with a 10 day course of antibiotic and reports overall improvement of symptoms. He denies any current symptoms and presents for re-evalution of urine to ensure no persistence of infection or hematuria. Denies any fevers or chills.     History obtained from the patient.    Problem List:  2019-05: Severe sepsis (H)  2018-01: Hypertension  2016-12: Rectal bleeding  Unspecified glaucoma  Lower Back Pain  Diarrhea  Other hyperlipidemia  Cataract  Hypertension, benign essential, goal below 140/90  Prediabetes  Sinus bradycardia  Abnormal finding in urine      Past Medical History:   Diagnosis Date   ? Arthritis    ? Benign bladder tumor    ? Cataract, right eye     both eyes, cataracts surgically removed   ? GERD (gastroesophageal reflux disease)    ? Glaucoma    ? Hemorrhoids    ? Hyperlipidemia    ? Hypertension    ? Prostatitis    ? Urgency of urination        Social History     Tobacco Use   ? Smoking status: Never Smoker   ? Smokeless tobacco: Never Used   Substance Use Topics   ? Alcohol use: No       Review of Systems   Constitutional: Negative for chills and fever.   Gastrointestinal: Negative for abdominal pain.   Genitourinary: Negative for decreased urine volume, difficulty urinating, dysuria, flank pain, frequency and hematuria.   All other systems reviewed and are negative.      Vitals:    09/25/20 1027 09/25/20 1050   BP: (!) 198/80 (!)  188/79   Patient Site: Right Arm Right Arm   Patient Position: Sitting Sitting   Cuff Size: Adult Regular Adult Regular   Pulse: 82    Resp: 12    Temp: 98.1  F (36.7  C)    TempSrc: Oral    SpO2: 100%        Physical Exam  Constitutional:       Appearance: Normal appearance. He is not ill-appearing or diaphoretic.   Cardiovascular:      Rate and Rhythm: Normal rate and regular rhythm.      Pulses: Normal pulses.      Heart sounds: Normal heart sounds.   Pulmonary:      Effort: Pulmonary effort is normal.      Breath sounds: Normal breath sounds.   Abdominal:      Tenderness: There is no abdominal tenderness. There is no right CVA tenderness or left CVA tenderness.   Skin:     General: Skin is warm.      Capillary Refill: Capillary refill takes less than 2 seconds.      Coloration: Skin is not pale.      Findings: No erythema or rash.   Neurological:      General: No focal deficit present.      Mental Status: He is alert and oriented to person, place, and time. Mental status is at baseline.   Psychiatric:         Mood and Affect: Mood normal.         Behavior: Behavior normal.         No notes on file    Labs:  Recent Results (from the past 72 hour(s))   Urinalysis-UC if Indicated   Result Value Ref Range    Color, UA Yellow Colorless, Yellow, Straw, Light Yellow    Clarity, UA Clear Clear    Glucose, UA Negative Negative    Bilirubin, UA Negative Negative    Ketones, UA Negative Negative    Specific Gravity, UA 1.025 1.005 - 1.030    Blood, UA Large (!) Negative    pH, UA 5.0 5.0 - 8.0    Protein,  mg/dL (!) Negative mg/dL    Urobilinogen, UA 0.2 E.U./dL 0.2 E.U./dL, 1.0 E.U./dL    Nitrite, UA Negative Negative    Leukocytes, UA Moderate (!) Negative    Bacteria, UA Few (!) None Seen hpf    RBC, UA  (!) None Seen, 0-2 hpf    WBC, UA 10-25 (!) None Seen, 0-5 hpf    Squam Epithel, UA None Seen None Seen, 0-5 lpf       Radiology: None obtained    Clinical Decision Making: At the end of the encounter, I  discussed results, diagnosis, medications. Discussed with patient urine findings remain positive for infection and hematuria. Discussed with patient and wife concern for asymptomatic and continued positive urine, reviewed previous urine cultures and selected Bactrim DS given sesitivites. Strongly encouraged completion of new course of antibiotics and follow up with Urologist as soon as feasible. Reviewed indications for follow up sooner if systemic symptoms present. Patient and wife understood and agreed to plan; will seek appointment for follow up today.       GERA Roque, CNP       1. Urinary tract infection with hematuria, site unspecified  sulfamethoxazole-trimethoprim (SEPTRA DS) 800-160 mg per tablet    Ambulatory referral to Urology   2. History of hematuria  Urinalysis-UC if Indicated    Culture, Urine   3. Recurrent and persistent hematuria  Ambulatory referral to Urology         Patient Instructions     Patient Education     Blood in the Urine    Blood in the urine (hematuria) has many possible causes. If it occurs after an injury (such as a car accident or fall), it is most often a sign of bruising to the kidney or bladder. Common causes of blood in the urine include urinary tract infections, kidney stones, inflammation, tumors, or certain other diseases of the kidney or bladder. Menstruation can cause blood to appear in the urine sample, although it is not coming from the urinary tract.  If only a trace amount of blood is present, it will show up on the urine test, even though the urine may be yellow and not pink or red. This may occur with any of the above conditions, as well as heavy exercise or high fever. In this case, your doctor may want to repeat the urine test on another day. This will show if the blood is still present. If it is, then other tests can be done to find out the cause.  Home care  Follow these home care guidelines:    If your urine does not appear bloody (pink, brown or red)  then you do not need to restrict your activity in any way.    If you can see blood in your urine, rest and avoid heavy exertion until your next exam. Do not use aspirin, blood thinners, or anti-platelet or anti-inflammatory medicines. These include ibuprofen and naproxen. These thin the blood and may increase bleeding.  Follow-up care  Follow up with your healthcare provider, or as advised. If you were injured and had blood in your urine, you should have a repeat urine test in 1 to 2 days. Contact your doctor for this test.  A radiologist will review any X-rays that were taken. You will be told of any new findings that may affect your care.  When to seek medical advice  Call your healthcare provider right away if any of these occur:    Bright red blood or blood clots in the urine (if you did not have this before)    Weakness, dizziness or fainting    New groin, abdominal, or back pain    Fever of 100.4 F (38 C) or higher, or as directed by your healthcare provider    Repeated vomiting    Bleeding from the nose or gums or easy bruising  Date Last Reviewed: 9/1/2016 2000-2017 [employer]. [address] [place] [id number]. All rights reserved. This information is not intended as a substitute for professional medical care. Always follow your healthcare professional's instructions.

## 2021-07-03 NOTE — ADDENDUM NOTE
Addendum Note by Medhat Santiago MD at 12/28/2020  9:50 AM     Author: Medhat Santiago MD Service: -- Author Type: Physician    Filed: 1/1/2021 10:00 AM Encounter Date: 12/28/2020 Status: Signed    : Medhat Santiago MD (Physician)    Addended by: MEDHAT SANTIAGO on: 1/1/2021 10:00 AM        Modules accepted: Orders

## 2021-07-03 NOTE — ADDENDUM NOTE
Addendum Note by Medhat Santiago MD at 12/28/2020  9:50 AM     Author: Medhat Santiago MD Service: -- Author Type: Physician    Filed: 1/1/2021  9:17 AM Encounter Date: 12/28/2020 Status: Signed    : Medhat Santiago MD (Physician)    Addended by: MEDHAT SANTIAGO on: 1/1/2021 09:17 AM        Modules accepted: Orders

## 2021-08-03 PROBLEM — R65.20 SEVERE SEPSIS (H): Status: RESOLVED | Noted: 2019-05-07 | Resolved: 2019-07-26

## 2021-08-03 PROBLEM — A41.9 SEVERE SEPSIS (H): Status: RESOLVED | Noted: 2019-05-07 | Resolved: 2019-07-26

## 2021-11-11 ENCOUNTER — LAB REQUISITION (OUTPATIENT)
Dept: LAB | Facility: CLINIC | Age: 86
End: 2021-11-11
Payer: COMMERCIAL

## 2021-11-11 DIAGNOSIS — U07.1 COVID-19: ICD-10-CM

## 2021-11-12 PROCEDURE — U0003 INFECTIOUS AGENT DETECTION BY NUCLEIC ACID (DNA OR RNA); SEVERE ACUTE RESPIRATORY SYNDROME CORONAVIRUS 2 (SARS-COV-2) (CORONAVIRUS DISEASE [COVID-19]), AMPLIFIED PROBE TECHNIQUE, MAKING USE OF HIGH THROUGHPUT TECHNOLOGIES AS DESCRIBED BY CMS-2020-01-R: HCPCS | Mod: ORL | Performed by: FAMILY MEDICINE

## 2021-11-14 LAB — SARS-COV-2 RNA RESP QL NAA+PROBE: NEGATIVE

## 2021-11-16 ENCOUNTER — LAB REQUISITION (OUTPATIENT)
Dept: LAB | Facility: CLINIC | Age: 86
End: 2021-11-16
Payer: COMMERCIAL

## 2021-11-16 DIAGNOSIS — U07.1 COVID-19: ICD-10-CM

## 2021-11-18 PROCEDURE — U0003 INFECTIOUS AGENT DETECTION BY NUCLEIC ACID (DNA OR RNA); SEVERE ACUTE RESPIRATORY SYNDROME CORONAVIRUS 2 (SARS-COV-2) (CORONAVIRUS DISEASE [COVID-19]), AMPLIFIED PROBE TECHNIQUE, MAKING USE OF HIGH THROUGHPUT TECHNOLOGIES AS DESCRIBED BY CMS-2020-01-R: HCPCS | Mod: ORL | Performed by: NURSE PRACTITIONER

## 2021-11-19 LAB
SARS-COV-2 RNA RESP QL NAA+PROBE: NORMAL
SARS-COV-2 RNA RESP QL NAA+PROBE: NOT DETECTED

## 2022-01-01 ENCOUNTER — MEDICAL CORRESPONDENCE (OUTPATIENT)
Dept: UROLOGY | Facility: CLINIC | Age: 87
End: 2022-01-01

## 2022-01-01 ENCOUNTER — MEDICAL CORRESPONDENCE (OUTPATIENT)
Dept: HEALTH INFORMATION MANAGEMENT | Facility: CLINIC | Age: 87
End: 2022-01-01

## 2022-01-01 ENCOUNTER — TELEPHONE (OUTPATIENT)
Dept: FAMILY MEDICINE | Facility: CLINIC | Age: 87
End: 2022-01-01

## 2022-01-01 DIAGNOSIS — Z87.440 PERSONAL HISTORY OF URINARY TRACT INFECTION: Primary | ICD-10-CM

## 2022-01-01 LAB
BACTERIA UR CULT: ABNORMAL
BACTERIA UR CULT: ABNORMAL
PATH REPORT.COMMENTS IMP SPEC: NORMAL
PATH REPORT.FINAL DX SPEC: NORMAL
PATH REPORT.GROSS SPEC: NORMAL
PATH REPORT.MICROSCOPIC SPEC OTHER STN: NORMAL
PATH REPORT.RELEVANT HX SPEC: NORMAL

## 2022-01-01 RX ORDER — NITROFURANTOIN 25; 75 MG/1; MG/1
100 CAPSULE ORAL 2 TIMES DAILY
Qty: 10 CAPSULE | Refills: 0 | Status: SHIPPED | OUTPATIENT
Start: 2022-01-01 | End: 2023-01-01

## 2022-01-07 ENCOUNTER — LAB REQUISITION (OUTPATIENT)
Dept: LAB | Facility: CLINIC | Age: 87
End: 2022-01-07
Payer: COMMERCIAL

## 2022-01-07 DIAGNOSIS — U07.1 COVID-19: ICD-10-CM

## 2022-01-11 PROCEDURE — U0003 INFECTIOUS AGENT DETECTION BY NUCLEIC ACID (DNA OR RNA); SEVERE ACUTE RESPIRATORY SYNDROME CORONAVIRUS 2 (SARS-COV-2) (CORONAVIRUS DISEASE [COVID-19]), AMPLIFIED PROBE TECHNIQUE, MAKING USE OF HIGH THROUGHPUT TECHNOLOGIES AS DESCRIBED BY CMS-2020-01-R: HCPCS | Mod: ORL | Performed by: NURSE PRACTITIONER

## 2022-01-12 LAB
SARS-COV-2 RNA RESP QL NAA+PROBE: NORMAL
SARS-COV-2 RNA RESP QL NAA+PROBE: NOT DETECTED

## 2022-01-17 ENCOUNTER — LAB REQUISITION (OUTPATIENT)
Dept: LAB | Facility: CLINIC | Age: 87
End: 2022-01-17
Payer: COMMERCIAL

## 2022-01-17 DIAGNOSIS — U07.1 COVID-19: ICD-10-CM

## 2022-01-18 PROCEDURE — U0005 INFEC AGEN DETEC AMPLI PROBE: HCPCS | Mod: ORL | Performed by: FAMILY MEDICINE

## 2022-01-20 LAB — SARS-COV-2 RNA RESP QL NAA+PROBE: NEGATIVE

## 2022-01-21 ENCOUNTER — LAB REQUISITION (OUTPATIENT)
Dept: LAB | Facility: CLINIC | Age: 87
End: 2022-01-21
Payer: COMMERCIAL

## 2022-01-21 DIAGNOSIS — U07.1 COVID-19: ICD-10-CM

## 2022-01-25 ENCOUNTER — LAB REQUISITION (OUTPATIENT)
Dept: LAB | Facility: CLINIC | Age: 87
End: 2022-01-25
Payer: COMMERCIAL

## 2022-01-25 DIAGNOSIS — U07.1 COVID-19: ICD-10-CM

## 2022-01-26 PROCEDURE — U0003 INFECTIOUS AGENT DETECTION BY NUCLEIC ACID (DNA OR RNA); SEVERE ACUTE RESPIRATORY SYNDROME CORONAVIRUS 2 (SARS-COV-2) (CORONAVIRUS DISEASE [COVID-19]), AMPLIFIED PROBE TECHNIQUE, MAKING USE OF HIGH THROUGHPUT TECHNOLOGIES AS DESCRIBED BY CMS-2020-01-R: HCPCS | Mod: ORL | Performed by: FAMILY MEDICINE

## 2022-01-28 ENCOUNTER — LAB REQUISITION (OUTPATIENT)
Dept: LAB | Facility: CLINIC | Age: 87
End: 2022-01-28
Payer: COMMERCIAL

## 2022-01-28 DIAGNOSIS — U07.1 COVID-19: ICD-10-CM

## 2022-01-28 LAB — SARS-COV-2 RNA RESP QL NAA+PROBE: NEGATIVE

## 2022-01-31 PROCEDURE — U0005 INFEC AGEN DETEC AMPLI PROBE: HCPCS | Mod: ORL | Performed by: NURSE PRACTITIONER

## 2022-02-02 LAB — SARS-COV-2 RNA RESP QL NAA+PROBE: NEGATIVE

## 2022-02-04 ENCOUNTER — LAB REQUISITION (OUTPATIENT)
Dept: LAB | Facility: CLINIC | Age: 87
End: 2022-02-04
Payer: COMMERCIAL

## 2022-02-04 DIAGNOSIS — U07.1 COVID-19: ICD-10-CM

## 2022-02-07 PROCEDURE — U0005 INFEC AGEN DETEC AMPLI PROBE: HCPCS | Mod: ORL | Performed by: FAMILY MEDICINE

## 2022-02-08 LAB — SARS-COV-2 RNA RESP QL NAA+PROBE: NOT DETECTED

## 2022-03-10 ENCOUNTER — TELEPHONE (OUTPATIENT)
Dept: UROLOGY | Facility: CLINIC | Age: 87
End: 2022-03-10

## 2022-03-10 ENCOUNTER — HOSPITAL ENCOUNTER (EMERGENCY)
Facility: CLINIC | Age: 87
Discharge: HOME | End: 2022-03-10
Attending: FAMILY MEDICINE | Admitting: FAMILY MEDICINE
Payer: COMMERCIAL

## 2022-03-10 ENCOUNTER — APPOINTMENT (OUTPATIENT)
Dept: CT IMAGING | Facility: CLINIC | Age: 87
End: 2022-03-10
Attending: FAMILY MEDICINE
Payer: COMMERCIAL

## 2022-03-10 VITALS
HEART RATE: 106 BPM | RESPIRATION RATE: 20 BRPM | SYSTOLIC BLOOD PRESSURE: 178 MMHG | DIASTOLIC BLOOD PRESSURE: 88 MMHG | OXYGEN SATURATION: 100 % | TEMPERATURE: 98.2 F

## 2022-03-10 DIAGNOSIS — N30.91 HEMORRHAGIC CYSTITIS: Primary | ICD-10-CM

## 2022-03-10 DIAGNOSIS — C67.2 MALIGNANT NEOPLASM OF LATERAL WALL OF URINARY BLADDER (H): ICD-10-CM

## 2022-03-10 LAB
ALBUMIN SERPL-MCNC: 4.1 G/DL (ref 3.4–5)
ALBUMIN UR-MCNC: 100 MG/DL
ALP SERPL-CCNC: 62 U/L (ref 40–150)
ALT SERPL W P-5'-P-CCNC: 12 U/L (ref 0–70)
ANION GAP SERPL CALCULATED.3IONS-SCNC: 6 MMOL/L (ref 3–14)
APPEARANCE UR: ABNORMAL
AST SERPL W P-5'-P-CCNC: 11 U/L (ref 0–45)
BACTERIA #/AREA URNS HPF: ABNORMAL /HPF
BASOPHILS # BLD AUTO: 0 10E3/UL (ref 0–0.2)
BASOPHILS NFR BLD AUTO: 0 %
BILIRUB SERPL-MCNC: 0.5 MG/DL (ref 0.2–1.3)
BILIRUB UR QL STRIP: NEGATIVE
BUN SERPL-MCNC: 21 MG/DL (ref 7–30)
CALCIUM SERPL-MCNC: 9.1 MG/DL (ref 8.5–10.1)
CHLORIDE BLD-SCNC: 111 MMOL/L (ref 94–109)
CO2 SERPL-SCNC: 27 MMOL/L (ref 20–32)
COLOR UR AUTO: YELLOW
CREAT SERPL-MCNC: 1.14 MG/DL (ref 0.66–1.25)
EOSINOPHIL # BLD AUTO: 0 10E3/UL (ref 0–0.7)
EOSINOPHIL NFR BLD AUTO: 0 %
ERYTHROCYTE [DISTWIDTH] IN BLOOD BY AUTOMATED COUNT: 13.2 % (ref 10–15)
GFR SERPL CREATININE-BSD FRML MDRD: 58 ML/MIN/1.73M2
GLUCOSE BLD-MCNC: 135 MG/DL (ref 70–99)
GLUCOSE UR STRIP-MCNC: 50 MG/DL
HCT VFR BLD AUTO: 44 % (ref 40–53)
HGB BLD-MCNC: 14.3 G/DL (ref 13.3–17.7)
HGB UR QL STRIP: ABNORMAL
IMM GRANULOCYTES # BLD: 0 10E3/UL
IMM GRANULOCYTES NFR BLD: 0 %
KETONES UR STRIP-MCNC: 20 MG/DL
LEUKOCYTE ESTERASE UR QL STRIP: ABNORMAL
LYMPHOCYTES # BLD AUTO: 1.5 10E3/UL (ref 0.8–5.3)
LYMPHOCYTES NFR BLD AUTO: 16 %
MCH RBC QN AUTO: 31.6 PG (ref 26.5–33)
MCHC RBC AUTO-ENTMCNC: 32.5 G/DL (ref 31.5–36.5)
MCV RBC AUTO: 97 FL (ref 78–100)
MONOCYTES # BLD AUTO: 0.4 10E3/UL (ref 0–1.3)
MONOCYTES NFR BLD AUTO: 5 %
NEUTROPHILS # BLD AUTO: 7.4 10E3/UL (ref 1.6–8.3)
NEUTROPHILS NFR BLD AUTO: 79 %
NITRATE UR QL: NEGATIVE
NRBC # BLD AUTO: 0 10E3/UL
NRBC BLD AUTO-RTO: 0 /100
PH UR STRIP: 6 [PH] (ref 5–7)
PLATELET # BLD AUTO: 234 10E3/UL (ref 150–450)
POTASSIUM BLD-SCNC: 3.9 MMOL/L (ref 3.4–5.3)
PROT SERPL-MCNC: 8 G/DL (ref 6.8–8.8)
RBC # BLD AUTO: 4.53 10E6/UL (ref 4.4–5.9)
RBC URINE: >182 /HPF
SODIUM SERPL-SCNC: 144 MMOL/L (ref 133–144)
SP GR UR STRIP: 1.01 (ref 1–1.03)
UROBILINOGEN UR STRIP-MCNC: NORMAL MG/DL
WBC # BLD AUTO: 9.4 10E3/UL (ref 4–11)
WBC CLUMPS #/AREA URNS HPF: PRESENT /HPF
WBC URINE: >182 /HPF

## 2022-03-10 PROCEDURE — 250N000011 HC RX IP 250 OP 636: Performed by: FAMILY MEDICINE

## 2022-03-10 PROCEDURE — 82040 ASSAY OF SERUM ALBUMIN: CPT | Performed by: FAMILY MEDICINE

## 2022-03-10 PROCEDURE — 74176 CT ABD & PELVIS W/O CONTRAST: CPT

## 2022-03-10 PROCEDURE — 76705 ECHO EXAM OF ABDOMEN: CPT

## 2022-03-10 PROCEDURE — 87086 URINE CULTURE/COLONY COUNT: CPT | Performed by: FAMILY MEDICINE

## 2022-03-10 PROCEDURE — 36415 COLL VENOUS BLD VENIPUNCTURE: CPT | Performed by: FAMILY MEDICINE

## 2022-03-10 PROCEDURE — 76705 ECHO EXAM OF ABDOMEN: CPT | Mod: 26 | Performed by: FAMILY MEDICINE

## 2022-03-10 PROCEDURE — 81003 URINALYSIS AUTO W/O SCOPE: CPT | Performed by: FAMILY MEDICINE

## 2022-03-10 PROCEDURE — 99285 EMERGENCY DEPT VISIT HI MDM: CPT | Mod: 25 | Performed by: FAMILY MEDICINE

## 2022-03-10 PROCEDURE — 85025 COMPLETE CBC W/AUTO DIFF WBC: CPT | Performed by: FAMILY MEDICINE

## 2022-03-10 PROCEDURE — 99285 EMERGENCY DEPT VISIT HI MDM: CPT | Mod: 25

## 2022-03-10 PROCEDURE — 80053 COMPREHEN METABOLIC PANEL: CPT | Performed by: FAMILY MEDICINE

## 2022-03-10 PROCEDURE — 96365 THER/PROPH/DIAG IV INF INIT: CPT | Mod: 59

## 2022-03-10 RX ORDER — CEFTRIAXONE 2 G/1
2 INJECTION, POWDER, FOR SOLUTION INTRAMUSCULAR; INTRAVENOUS ONCE
Status: COMPLETED | OUTPATIENT
Start: 2022-03-10 | End: 2022-03-10

## 2022-03-10 RX ORDER — CEPHALEXIN 500 MG/1
500 CAPSULE ORAL 3 TIMES DAILY
Qty: 21 CAPSULE | Refills: 0 | Status: SHIPPED | OUTPATIENT
Start: 2022-03-10 | End: 2022-03-17

## 2022-03-10 RX ADMIN — CEFTRIAXONE 2 G: 2 INJECTION, POWDER, FOR SOLUTION INTRAMUSCULAR; INTRAVENOUS at 11:35

## 2022-03-10 ASSESSMENT — ENCOUNTER SYMPTOMS
VOMITING: 0
DIARRHEA: 0
HEMATURIA: 1
SORE THROAT: 0
DYSURIA: 1
SHORTNESS OF BREATH: 0
ABDOMINAL PAIN: 1
NAUSEA: 0
CONSTIPATION: 0
BLOOD IN STOOL: 0
COUGH: 0
WHEEZING: 0
PALPITATIONS: 0
SINUS PRESSURE: 0
DIAPHORESIS: 0
CHILLS: 0
FREQUENCY: 1
HEADACHES: 0
FEVER: 0

## 2022-03-10 NOTE — ED NOTES
Patient presents to the ED from home for complaint of hematuria and urinary frequency. Prior hx or bladder CA one year ago with treatment. Over past 2 days has noted urinary frequency. Noted bright red hematuria starting last night. Denies pain. MD at bedside

## 2022-03-10 NOTE — TELEPHONE ENCOUNTER
Reason for Call:  Other appointment    Detailed comments: Pt's wife spoke with scheduling and soonest appt. In [place] is not until July and booked out as well at other locations. Pt was seen in ER today. Pt's wife states she is 89 and it is hard for her to drive far.     Phone Number Patient can be reached at: [phone]    Best Time: any    Can we leave a detailed message on this number? YES    Call taken on 3/10/2022 at 2:44 PM by Brisa Valle

## 2022-03-10 NOTE — DISCHARGE INSTRUCTIONS
ICD-10-CM    1. Hemorrhagic cystitis  N30.91     you were given ceftriaxone one dose here.  take keflex 500 mg orally three times daily for 7 days.  recheck clinic tomorrow. return here for fever, worsening.  stay hydrated 64 oz fluid per day.  also follow-up with urology.

## 2022-03-10 NOTE — TELEPHONE ENCOUNTER
I spoke to Vaibhav and advised her that she should f/u with the urology group that her  had been following for his bladder cancer in the past.  She reports that was in [place] and this is to far to drive.  I informed her that our Urologist are part time and are currently booking appointments out into summer months for appointments. Vaibhav hung up on me.    Janay Quigley  [facility] RN

## 2022-03-10 NOTE — ED PROVIDER NOTES
History     Chief Complaint   Patient presents with     Hematuria     Urinary Retention     HPI  Gabriel Esposito is a 89 year old male who presents with a history of prior transitional cell carcinoma bladder.  Possible prior BCG vaccination injection and radiation to the bladder.  Began to experience dysuria urgency frequency hematuria onset in the last 24 hours.  He has had no fever.  No flank pain.    No significant abdominal pain.  No penile trauma.  No scrotal trauma.  Tolerating food fluids.  No chest pain shortness of breath other systemic symptoms.    Allergies:  No Known Allergies    Problem List:    Patient Active Problem List    Diagnosis Date Noted     Cataract 01/12/2021     Priority: Medium     Formatting of this note might be different from the original.  Created by Conversion  Formatting of this note might be different from the original.  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     Priority: Medium     Hematuria 09/29/2020     Priority: Medium     Sepsis (H) 08/14/2020     Priority: Medium     Benign prostatic hyperplasia with lower urinary tract symptoms 05/13/2019     Priority: Medium     Difficulty in walking, not elsewhere classified 05/13/2019     Priority: Medium     Fluid overload, unspecified 05/13/2019     Priority: Medium     Gastro-esophageal reflux disease without esophagitis 05/13/2019     Priority: Medium     Low back pain 05/13/2019     Priority: Medium     Muscle weakness (generalized) 05/13/2019     Priority: Medium     Other abnormal glucose 05/13/2019     Priority: Medium     Formatting of this note might be different from the original.  Created by Conversion       Other hyperlipidemia 05/13/2019     Priority: Medium     Formatting of this note might be different from the original.  Created by Conversion       Pain in unspecified hip 05/13/2019     Priority: Medium     Retention of urine, unspecified 05/13/2019     Priority: Medium     Sinus  bradycardia 05/13/2019     Priority: Medium     Unspecified glaucoma 05/13/2019     Priority: Medium     Formatting of this note might be different from the original.  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) 03/04/2016     Priority: Medium        Past Medical History:    Past Medical History:   Diagnosis Date     Arthritis      Benign bladder tumor      GERD (gastroesophageal reflux disease)      Glaucoma      Hemorrhoids      Hyperlipidemia      Hypertension      Prostatitis      Urgency of urination        Past Surgical History:    Past Surgical History:   Procedure Laterality Date     BLADDER SURGERY      removal of benign tumor     CYSTOSCOPY N/A 9/29/2020    Procedure: CYSTOURETHROSCOPY WITH FULGURATION RESECTION OF BLADDER TUMOR;  Surgeon: Alejandro Mcdonough MD;  Location: [facility] [place];  Service: Urology     CYSTOURETHROSCOPY  09/29/2020     EYE SURGERY Bilateral     Cataract     HEMORRHOIDECTOMY INTERNAL LIGATION      Description: Hemorrhoidectomy;  Recorded: 04/09/2010;       Family History:    Family History   Problem Relation Age of Onset     Diabetes Mother        Social History:  Marital Status:   [2]  Social History     Tobacco Use     Smoking status: Former Smoker     Smokeless tobacco: Never Used     Tobacco comment: quit smoking in 1983   Substance Use Topics     Alcohol use: No     Drug use: No        Medications:    Acetaminophen (TYLENOL PO)  aspirin 81 MG EC tablet  brimonidine (ALPHAGAN-P) 0.15 % ophthalmic solution  dorzolamide-timolol PF (COSOPT) 22.3-6.8 MG/ML opthalmic solution  latanoprost (XALATAN) 0.005 % ophthalmic solution  loperamide (IMODIUM) 2 MG capsule  menthol-zinc oxide (CALMOSEPTINE)  0.44-20.6 % OINT ointment  multivitamin w/minerals (MULTI-VITAMIN) tablet          Review of Systems   Constitutional: Negative for chills, diaphoresis and fever.   HENT: Negative for ear pain, sinus pressure and sore throat.    Eyes: Negative for visual disturbance.   Respiratory: Negative for cough, shortness of breath and wheezing.    Cardiovascular: Negative for chest pain and palpitations.   Gastrointestinal: Positive for abdominal pain. Negative for blood in stool, constipation, diarrhea, nausea and vomiting.   Genitourinary: Positive for dysuria, frequency, hematuria and urgency.   Skin: Negative for rash.   Neurological: Negative for headaches.   All other systems reviewed and are negative.      Physical Exam   BP: (!) 178/88  Pulse: 106  Temp: 97.7  F (36.5  C)  Resp: 20  SpO2: 100 %      Physical Exam  Constitutional:       General: He is in acute distress.      Appearance: He is not diaphoretic.   HENT:      Head: Atraumatic.   Eyes:      Conjunctiva/sclera: Conjunctivae normal.   Cardiovascular:      Rate and Rhythm: Normal rate and regular rhythm.      Heart sounds: No murmur heard.  Pulmonary:      Effort: Pulmonary effort is normal. No respiratory distress.      Breath sounds: Normal breath sounds. No stridor. No wheezing or rhonchi.   Abdominal:      General: Abdomen is flat. There is no distension.      Palpations: There is no mass.      Tenderness: There is no abdominal tenderness. There is no guarding.   Musculoskeletal:      Cervical back: Neck supple.      Right lower leg: No edema.      Left lower leg: No edema.   Skin:     Coloration: Skin is not pale.      Findings: No rash.   Neurological:      General: No focal deficit present.      Mental Status: He is alert.      Motor: No weakness.         ED Course                 Procedures              Critical Care time:  none               Results for orders placed or performed during the hospital encounter of 03/10/22 (from the past 24 hour(s))    POC US ABDOMEN LIMITED    Impression    [facility] Procedure Note      Limited Bedside ED Ultrasound of the Urinary Bladder:    PERFORMED BY: Dr. Walter Castillo MD  INDICATIONS:  Possible obstrucion and/or mass  PROBE: Low frequency convex probe  BODY LOCATION:  Abdomen  FINDINGS:  Visualization of the bladder in longitudinal and transverse views demonstrated a distended state.  The bladder dimensions measured: Bladder was approximately a 5 x 6 cm but I was called away as I was completing the other dimension other review.  However there does not appear to be significant urinary retention beyond 100 to 200 cc in the bladder.  INTERPRETATION:  Total calculated volume was estimated at 100-200 cc.  The evaluation was normal without evidence of obstruction or mass.  No bladder distention noted.  IMAGE DOCUMENTATION: Images were archived to PACs system.     UA with Microscopic reflex to Culture    Specimen: Urine, Midstream   Result Value Ref Range    Color Urine Yellow Colorless, Straw, Light Yellow, Yellow    Appearance Urine Cloudy (A) Clear    Glucose Urine 50  (A) Negative mg/dL    Bilirubin Urine Negative Negative    Ketones Urine 20  (A) Negative mg/dL    Specific Gravity Urine 1.013 1.003 - 1.035    Blood Urine Large (A) Negative    pH Urine 6.0 5.0 - 7.0    Protein Albumin Urine 100  (A) Negative mg/dL    Urobilinogen Urine Normal Normal, 2.0 mg/dL    Nitrite Urine Negative Negative    Leukocyte Esterase Urine Small (A) Negative    Bacteria Urine Few (A) None Seen /HPF    WBC Clumps Urine Present (A) None Seen /HPF    RBC Urine >182 (H) <=2 /HPF    WBC Urine >182 (H) <=5 /HPF    Narrative    Urine Culture not indicated  Urine Culture ordered based on laboratory criteria   CBC with platelets differential    Narrative    The following orders were created for panel order CBC with platelets differential.  Procedure                               Abnormality         Status                     ---------                                -----------         ------                     CBC with platelets and d...[[id number]]                      Final result                 Please view results for these tests on the individual orders.   Comprehensive metabolic panel   Result Value Ref Range    Sodium 144 133 - 144 mmol/L    Potassium 3.9 3.4 - 5.3 mmol/L    Chloride 111 (H) 94 - 109 mmol/L    Carbon Dioxide (CO2) 27 20 - 32 mmol/L    Anion Gap 6 3 - 14 mmol/L    Urea Nitrogen 21 7 - 30 mg/dL    Creatinine 1.14 0.66 - 1.25 mg/dL    Calcium 9.1 8.5 - 10.1 mg/dL    Glucose 135 (H) 70 - 99 mg/dL    Alkaline Phosphatase 62 40 - 150 U/L    AST 11 0 - 45 U/L    ALT 12 0 - 70 U/L    Protein Total 8.0 6.8 - 8.8 g/dL    Albumin 4.1 3.4 - 5.0 g/dL    Bilirubin Total 0.5 0.2 - 1.3 mg/dL    GFR Estimate 58 (L) >60 mL/min/1.73m2   CBC with platelets and differential   Result Value Ref Range    WBC Count 9.4 4.0 - 11.0 10e3/uL    RBC Count 4.53 4.40 - 5.90 10e6/uL    Hemoglobin 14.3 13.3 - 17.7 g/dL    Hematocrit 44.0 40.0 - 53.0 %    MCV 97 78 - 100 fL    MCH 31.6 26.5 - 33.0 pg    MCHC 32.5 31.5 - 36.5 g/dL    RDW 13.2 10.0 - 15.0 %    Platelet Count 234 150 - 450 10e3/uL    % Neutrophils 79 %    % Lymphocytes 16 %    % Monocytes 5 %    % Eosinophils 0 %    % Basophils 0 %    % Immature Granulocytes 0 %    NRBCs per 100 WBC 0 <1 /100    Absolute Neutrophils 7.4 1.6 - 8.3 10e3/uL    Absolute Lymphocytes 1.5 0.8 - 5.3 10e3/uL    Absolute Monocytes 0.4 0.0 - 1.3 10e3/uL    Absolute Eosinophils 0.0 0.0 - 0.7 10e3/uL    Absolute Basophils 0.0 0.0 - 0.2 10e3/uL    Absolute Immature Granulocytes 0.0 <=0.4 10e3/uL    Absolute NRBCs 0.0 10e3/uL   Abd/pelvis CT - no contrast - Stone Protocol    Narrative    CT ABDOMEN/PELVIS WITHOUT CONTRAST March 10, 2022 11:00 AM    CLINICAL HISTORY: Hematuria, unknown cause.    TECHNIQUE: CT scan of the abdomen and pelvis was performed without IV  contrast. Multiplanar reformats were obtained. Dose  reduction  techniques were used.  CONTRAST: None.    COMPARISON: 11/7/2020.    FINDINGS:   LOWER CHEST: Normal.    HEPATOBILIARY: Unchanged hypodense liver lesions presumed cysts or  hemangiomas. No specific follow-up necessary. Gallbladder  unremarkable.    PANCREAS: Normal.    SPLEEN: Normal.    ADRENAL GLANDS: Normal.    KIDNEYS/BLADDER: Moderate bilateral hydronephrosis and hydroureter. No  contour deforming renal masses. Calcifications associated with the  left renal hilum appear to be vascular. There is abnormal thickening  of the anterior urinary bladder wall that measures up to 1.7 cm. There  are multiple bladder diverticula. Wispy high density in the inferior  bladder compatible with hematuria.    BOWEL: There are a few scattered clonic diverticula but no evidence of  diverticulitis. No bowel obstruction or inflammatory change. Normal  appendix.    LYMPH NODES: Normal.    VASCULATURE: Nonaneurysmal aortoiliac atherosclerosis.    PELVIC ORGANS: Enlarged prostate measures up to 5.6 cm transversely.    OTHER: No ascites.    MUSCULOSKELETAL: No destructive bone lesions.      Impression    IMPRESSION:   1.  Abnormal thickening of the anterior urinary bladder wall could  represent malignancy. Cystoscopy suggested.  2.  Numerous urinary bladder diverticula presumably related to chronic  outlet obstruction due to enlarged prostate.  3.  Bilateral hydronephrosis and hydroureter without evidence of  ureteral obstruction.    GENOVEVA TEMPLETON MD         SYSTEM ID:  [id number]       Medications - No data to display    Assessments & Plan (with Medical Decision Making)     MDM: Gabriel Esposito is a 89 year old male who presents with hematuria dysuria urgency frequency findings on your UA consistent with urinary tract infection, likely hemorrhagic cystitis on top of a history of bladder cancer some findings on CT that could represent malignant changes of the bladder but will need follow-up with urology.  Consult written.  Or  recommended that he follow-up with his prior urologist.  He will need cystoscopy.  Otherwise no signs of infected ureteral stone at this point.  Stable for discharge home.  Did receive Rocephin.  Plan for home Keflex 3 times a day until urine culture back.  Recommended close interval follow-up with his primary provider tomorrow.    I have reviewed the nursing notes.    I have reviewed the findings, diagnosis, plan and need for follow up with the patient.       New Prescriptions    No medications on file       Final diagnoses:   Hemorrhagic cystitis - you were given ceftriaxone one dose here.  take keflex 500 mg orally three times daily for 7 days.  recheck clinic tomorrow. return here for fever, worsening.  stay hydrated 64 oz fluid per day.  also follow-up with urology.   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

## 2022-03-11 LAB — BACTERIA UR CULT: NORMAL

## 2022-04-08 ENCOUNTER — DOCUMENTATION ONLY (OUTPATIENT)
Dept: OTHER | Facility: CLINIC | Age: 87
End: 2022-04-08
Payer: COMMERCIAL

## 2022-04-13 ENCOUNTER — HOSPITAL ENCOUNTER (EMERGENCY)
Facility: CLINIC | Age: 87
Discharge: HOME | End: 2022-04-13
Attending: EMERGENCY MEDICINE | Admitting: EMERGENCY MEDICINE
Payer: COMMERCIAL

## 2022-04-13 VITALS
SYSTOLIC BLOOD PRESSURE: 179 MMHG | HEART RATE: 88 BPM | TEMPERATURE: 97.7 F | OXYGEN SATURATION: 100 % | WEIGHT: 150 LBS | BODY MASS INDEX: 21.22 KG/M2 | DIASTOLIC BLOOD PRESSURE: 75 MMHG | RESPIRATION RATE: 18 BRPM

## 2022-04-13 DIAGNOSIS — N30.00 ACUTE CYSTITIS WITHOUT HEMATURIA: ICD-10-CM

## 2022-04-13 DIAGNOSIS — R33.9 URINARY RETENTION WITH INCOMPLETE BLADDER EMPTYING: ICD-10-CM

## 2022-04-13 LAB
ALBUMIN UR-MCNC: 30 MG/DL
APPEARANCE UR: ABNORMAL
BACTERIA #/AREA URNS HPF: ABNORMAL /HPF
BILIRUB UR QL STRIP: NEGATIVE
COLOR UR AUTO: YELLOW
GLUCOSE UR STRIP-MCNC: NEGATIVE MG/DL
HGB UR QL STRIP: ABNORMAL
KETONES UR STRIP-MCNC: NEGATIVE MG/DL
LEUKOCYTE ESTERASE UR QL STRIP: ABNORMAL
NITRATE UR QL: NEGATIVE
PH UR STRIP: 6 [PH] (ref 5–7)
RBC URINE: 14 /HPF
SP GR UR STRIP: 1.01 (ref 1–1.03)
UROBILINOGEN UR STRIP-MCNC: NORMAL MG/DL
WBC CLUMPS #/AREA URNS HPF: PRESENT /HPF
WBC URINE: >182 /HPF

## 2022-04-13 PROCEDURE — 81001 URINALYSIS AUTO W/SCOPE: CPT | Performed by: EMERGENCY MEDICINE

## 2022-04-13 PROCEDURE — 87086 URINE CULTURE/COLONY COUNT: CPT | Performed by: EMERGENCY MEDICINE

## 2022-04-13 PROCEDURE — 76857 US EXAM PELVIC LIMITED: CPT | Performed by: EMERGENCY MEDICINE

## 2022-04-13 PROCEDURE — 76857 US EXAM PELVIC LIMITED: CPT | Mod: 26 | Performed by: EMERGENCY MEDICINE

## 2022-04-13 PROCEDURE — C9803 HOPD COVID-19 SPEC COLLECT: HCPCS | Performed by: EMERGENCY MEDICINE

## 2022-04-13 PROCEDURE — 99284 EMERGENCY DEPT VISIT MOD MDM: CPT | Mod: 25 | Performed by: EMERGENCY MEDICINE

## 2022-04-13 PROCEDURE — 51798 US URINE CAPACITY MEASURE: CPT | Performed by: EMERGENCY MEDICINE

## 2022-04-13 RX ORDER — CIPROFLOXACIN 500 MG/1
500 TABLET, FILM COATED ORAL 2 TIMES DAILY
Qty: 28 TABLET | Refills: 0 | Status: SHIPPED | OUTPATIENT
Start: 2022-04-13 | End: 2022-04-27

## 2022-04-13 NOTE — ED NOTES
"Pt requesting a catheter due to \"urinating all the time.\"  No fevers.  No flank pain.  Hx of UTI about a month ago.  "

## 2022-04-13 NOTE — ED NOTES
Pt teaching regarding catheter care. Information and directions required multiple times of explanation for pt to understand. Pt upset about size of bag, reiterate to pt the importance of keeping bag below level of bladder and to keep connections intact to prevent backflow of urine and severe infection. Spouse states understanding of this. Pt assisted out to vehicle via w/c.

## 2022-04-13 NOTE — ED TRIAGE NOTES
Recent urinary tract infection with hematuria, treated with antibiotics, blood cleared up, has been having incontinence, urgency and frequency,occasional burning, states they had offered a catheter at that time so thinks may need one

## 2022-04-13 NOTE — ED PROVIDER NOTES
History     Chief Complaint   Patient presents with     Incontinence     HPI  Gabriel Esposito is a 89 year old male who presents for dysuria and urinary frequency.  History is obtained from the patient, his significant other, and review of the medical record.  The patient was evaluated here for blood in his urine frequency about 1 month ago and was diagnosed with hemorrhagic cystitis and treated with a week of cephalexin.  He said he did get better and does not have the blood any longer, however he continues to have frequent urination and some mild burning with urination.  No pain in his abdomen or his back.  He says that the frequency has gotten progressively worse and now he is up multiple times in the middle the night to urinate.  Today he was incontinent of urine and this happens fairly commonly but has been happening much more frequently now.  He denies any fevers or chills.  No nausea, vomiting, diarrhea, chest pain, shortness of breath, or rash.  No pain or swelling of the penis or testicles.  He is requesting a catheter so that he does not have to go to the bathroom as often.    Allergies:  No Known Allergies    Problem List:    Patient Active Problem List    Diagnosis Date Noted     Cataract 01/12/2021     Priority: Medium     Formatting of this note might be different from the original.  Created by Conversion  Formatting of this note might be different from the original.  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     Priority: Medium     Hematuria 09/29/2020     Priority: Medium     Sepsis (H) 08/14/2020     Priority: Medium     Benign prostatic hyperplasia with lower urinary tract symptoms 05/13/2019     Priority: Medium     Difficulty in walking, not elsewhere classified 05/13/2019     Priority: Medium     Fluid overload, unspecified 05/13/2019     Priority: Medium     Gastro-esophageal reflux disease without esophagitis 05/13/2019     Priority: Medium     Low back pain  05/13/2019     Priority: Medium     Muscle weakness (generalized) 05/13/2019     Priority: Medium     Other abnormal glucose 05/13/2019     Priority: Medium     Formatting of this note might be different from the original.  Created by Conversion       Other hyperlipidemia 05/13/2019     Priority: Medium     Formatting of this note might be different from the original.  Created by Conversion       Pain in unspecified hip 05/13/2019     Priority: Medium     Retention of urine, unspecified 05/13/2019     Priority: Medium     Sinus bradycardia 05/13/2019     Priority: Medium     Unspecified glaucoma 05/13/2019     Priority: Medium     Formatting of this note might be different from the original.  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) 03/04/2016     Priority: Medium        Past Medical History:    Past Medical History:   Diagnosis Date     Arthritis      Benign bladder tumor      GERD (gastroesophageal reflux disease)      Glaucoma      Hemorrhoids      Hyperlipidemia      Hypertension      Prostatitis      Urgency of urination        Past Surgical History:    Past Surgical History:   Procedure Laterality Date     BLADDER SURGERY      removal of benign tumor     CYSTOSCOPY N/A 9/29/2020    Procedure: CYSTOURETHROSCOPY WITH FULGURATION RESECTION OF BLADDER TUMOR;  Surgeon: Alejandro Mcdonough MD;  Location: [facility];  Service: Urology     CYSTOURETHROSCOPY  09/29/2020     EYE SURGERY Bilateral     Cataract     HEMORRHOIDECTOMY INTERNAL LIGATION      Description: Hemorrhoidectomy;  Recorded: 04/09/2010;       Family History:    Family History   Problem Relation Age of Onset     Diabetes Mother        Social History:  Marital  Status:   [2]  Social History     Tobacco Use     Smoking status: Former Smoker     Smokeless tobacco: Never Used     Tobacco comment: quit smoking in 1983   Substance Use Topics     Alcohol use: No     Drug use: No        Medications:    ciprofloxacin (CIPRO) 500 MG tablet  Acetaminophen (TYLENOL PO)  aspirin 81 MG EC tablet  brimonidine (ALPHAGAN-P) 0.15 % ophthalmic solution  dorzolamide-timolol PF (COSOPT) 22.3-6.8 MG/ML opthalmic solution  latanoprost (XALATAN) 0.005 % ophthalmic solution  loperamide (IMODIUM) 2 MG capsule  menthol-zinc oxide (CALMOSEPTINE) 0.44-20.6 % OINT ointment  multivitamin w/minerals (MULTI-VITAMIN) tablet          Review of Systems  A 4 point review of systems was performed. All pertinent positives and negatives were listed in the HPI and rest of ROS were otherwise negative.    Physical Exam   BP: (!) 179/75  Pulse: 88  Temp: 97.7  F (36.5  C)  Resp: 18  Weight: 68 kg (150 lb)  SpO2: 100 %      Physical Exam  Vitals and nursing note reviewed.   Constitutional:       General: He is in acute distress.      Appearance: He is well-developed. He is not diaphoretic.   HENT:      Head: Normocephalic and atraumatic.      Right Ear: External ear normal.      Left Ear: External ear normal.      Nose: Nose normal.   Eyes:      General: No scleral icterus.     Conjunctiva/sclera: Conjunctivae normal.   Cardiovascular:      Rate and Rhythm: Normal rate and regular rhythm.   Pulmonary:      Effort: Pulmonary effort is normal. No respiratory distress.      Breath sounds: No stridor.   Abdominal:      General: There is no distension.      Palpations: Abdomen is soft.      Tenderness: There is no abdominal tenderness. There is no right CVA tenderness, left CVA tenderness, guarding or rebound.   Genitourinary:     Penis: Normal.       Testes: Normal.   Musculoskeletal:      Cervical back: Normal range of motion.   Skin:     General: Skin is warm and dry.   Neurological:      Mental Status: He is  alert and oriented to person, place, and time.   Psychiatric:         Behavior: Behavior normal.         ED Course                 Procedures    Results for orders placed during the hospital encounter of 04/13/22    POC US ABDOMEN LIMITED    Impression  [facility] Procedure Note    Limited Bedside ED Ultrasound of the Urinary Bladder:    PERFORMED BY: Dr. Dandre Hall MD  INDICATIONS:  Urinary incontinence  PROBE: Low frequency convex probe  BODY LOCATION:  Abdomen  FINDINGS:  Visualization of the bladder in longitudinal and transverse views demonstrated a distended state.  The bladder dimensions measured: 350 mL.  INTERPRETATION:  Total calculated volume was estimated at 350 cc.  IMAGE DOCUMENTATION: Images were archived to PACs system.            Critical Care time:  none               Results for orders placed or performed during the hospital encounter of 04/13/22 (from the past 24 hour(s))   UA with Microscopic   Result Value Ref Range    Color Urine Yellow Colorless, Straw, Light Yellow, Yellow    Appearance Urine Cloudy (A) Clear    Glucose Urine Negative Negative mg/dL    Bilirubin Urine Negative Negative    Ketones Urine Negative Negative mg/dL    Specific Gravity Urine 1.008 1.003 - 1.035    Blood Urine Moderate (A) Negative    pH Urine 6.0 5.0 - 7.0    Protein Albumin Urine 30  (A) Negative mg/dL    Urobilinogen Urine Normal Normal, 2.0 mg/dL    Nitrite Urine Negative Negative    Leukocyte Esterase Urine Large (A) Negative    Bacteria Urine Few (A) None Seen /HPF    WBC Clumps Urine Present (A) None Seen /HPF    RBC Urine 14 (H) <=2 /HPF    WBC Urine >182 (H) <=5 /HPF   POC US ABDOMEN LIMITED    Impression    [facility] Procedure Note      Limited Bedside ED Ultrasound of the Urinary Bladder:    PERFORMED BY: Dr. Dandre Hall MD  INDICATIONS:  Urinary incontinence  PROBE: Low frequency convex probe  BODY LOCATION:  Abdomen  FINDINGS:  Visualization of the bladder in  longitudinal and transverse views demonstrated a distended state.  The bladder dimensions measured: 350 mL.  INTERPRETATION:  Total calculated volume was estimated at 350 cc.   IMAGE DOCUMENTATION: Images were archived to PACs system.        Medications - No data to display    Assessments & Plan (with Medical Decision Making)   89-year-old male presents for dysuria and urinary frequency with urinary incontinence.  Temperature is 36.5  C, heart rate 88, SPO2 is 100% on room air.  Abdominal exam is benign and not concerning for an acute surgical process such as obstruction.  Normal genitourinary exam.  No signs of epididymitis or orchitis.  Bedside ultrasound shows evidence of urinary retention with incomplete bladder emptying given his bladder is distended with more than 350 mL of urine after urinating.  Urinalysis is positive for white blood cells, white blood cell clumps, large leukocyte esterase, negative nitrite.  Urine culture is pending.  At this time the patient is safe to discharge.  A Gupta catheter was placed to help with incomplete emptying.  I will treat the patient with a 2-week course of ciprofloxacin to treat for prostatitis.  He is told to return if he has worsening symptoms, otherwise follow-up in clinic and follow-up with urology.  The patient is in agreement with this plan.    I have reviewed the nursing notes.    I have reviewed the findings, diagnosis, plan and need for follow up with the patient.       New Prescriptions    CIPROFLOXACIN (CIPRO) 500 MG TABLET    Take 1 tablet (500 mg) by mouth 2 times daily for 14 days       Final diagnoses:   Urinary retention with incomplete bladder emptying   Acute cystitis without hematuria       4/13/2022   [facility] EMERGENCY DEPT     Dandre Hall MD  04/13/22 [id number]

## 2022-04-13 NOTE — DISCHARGE INSTRUCTIONS
Take the antibiotics as prescribed.  Return to the emergency department for worsening symptoms, fevers, repeated vomiting, abdominal pain, or other concerns.  Otherwise follow-up in primary care clinic for recheck and follow-up in urology clinic.

## 2022-04-14 NOTE — RESULT ENCOUNTER NOTE
[facility] Emergency Dept discharge antibiotic (if prescribed): Ciprofloxacin (Cipro) 500 mg tablet, 1 tablet (500 mg) by mouth 2 times daily for 14 days.   Date of Rx (if applicable):  4/13/22  No changes in treatment per [facility] ED Lab Result Urine culture protocol.

## 2022-04-15 LAB — BACTERIA UR CULT: NORMAL

## 2022-04-19 NOTE — PROGRESS NOTES
Chief Complaint:   Urinary retention          History of Present Illness:   Gabriel Esposito is a 89 year old male with a history of sinus bradycardia, HTM, glaucoma, BPH with LUTS, urethral stricture, and bladder cancer who presents for evaluation of urinary retention.     He has been following with [facility] for management of his bladder tumor.     Patient presented to the ED on 4/13/2022 for dysuria, urinary frequency, and urinary incontinence.  He was bladder scanned and found to have greater than 350 mL of urine in his bladder after urinating. A golden catheter was placed and the patient was given a 14 day course of ciprofloxacin for presumed prostatitis. UA was notable for leukocyte esterase, >182 WBCs, and 14 RBCs. His urine culture was negative.     He also presented to the ED on 3/10/2022 with gross hematuria. CT showed abnormal thickening of the anterior urinary bladder wall and bilateral hydronephrosis.  His creatinine of 3/10/2022 was 1.14 and his GFR was 58 mL/min.    He has continued to take ciprofloxacin for presumed prostatitis. He reports that he was urinating every 30 minutes prior to catheter placement and waking 7-8 times a night to urinate.          Past Medical History:     Past Medical History:   Diagnosis Date     Arthritis      Benign bladder tumor      GERD (gastroesophageal reflux disease)      Glaucoma      Hemorrhoids      Hyperlipidemia      Hypertension      Prostatitis      Urgency of urination             Past Surgical History:     Past Surgical History:   Procedure Laterality Date     BLADDER SURGERY      removal of benign tumor     CYSTOSCOPY N/A 9/29/2020    Procedure: CYSTOURETHROSCOPY WITH FULGURATION RESECTION OF BLADDER TUMOR;  Surgeon: Alejandro Mcdonough MD;  [place] [facility] [place]     CYSTOURETHROSCOPY  09/29/2020     EYE SURGERY Bilateral     Cataract     HEMORRHOIDECTOMY INTERNAL LIGATION      Description: Hemorrhoidectomy;  Recorded:  "04/09/2010;            Medications     Current Outpatient Medications   Medication     Acetaminophen (TYLENOL PO)     aspirin 81 MG EC tablet     brimonidine (ALPHAGAN-P) 0.15 % ophthalmic solution     ciprofloxacin (CIPRO) 500 MG tablet     dorzolamide-timolol PF (COSOPT) 22.3-6.8 MG/ML opthalmic solution     latanoprost (XALATAN) 0.005 % ophthalmic solution     loperamide (IMODIUM) 2 MG capsule     menthol-zinc oxide (CALMOSEPTINE) 0.44-20.6 % OINT ointment     multivitamin w/minerals (MULTI-VITAMIN) tablet     No current facility-administered medications for this visit.            Allergies:   Patient has no known allergies.         Review of Systems:  From intake questionnaire   Negative 14 system review except as noted on HPI, nurse's note.         Physical Exam:   Patient is a 89 year old  male   Vitals: Blood pressure 109/67, pulse 71, temperature 97  F (36.1  C), temperature source Tympanic, height 1.791 m (5' 10.5\"), weight 68 kg (150 lb).  General Appearance Adult: Alert, no acute distress, oriented.  Lungs: Non-labored breathing.  Heart: No obvious jugular venous distension present.  Neuro: Alert, oriented, speech and mentation normal        Labs and Pathology:    I personally reviewed all applicable laboratory data and went over findings with patient  Significant for:     BMP RESULTS:  Recent Labs   Lab Test 03/10/22  1039 04/07/21  1710 11/07/20  1424 09/30/20  2357 09/30/20  0630 09/29/20  1710    140 142  --  139 141   POTASSIUM 3.9 3.7 3.8  --  4.3 4.5   CHLORIDE 111* 107 110*  --  105 105   CO2 27 25 28  --  27 25   ANIONGAP 6 8 4  --  7 11   * 127* 100* 93 114 139*   BUN 21 25 17  --  20 18   CR 1.14 1.11 1.03  --  0.96 0.99   GFRESTIMATED 58* 55* 61  --  >60 >60   GFRESTBLACK  --  64 70  --  >60 >60   GREGORIO 9.1 8.9 9.0  --  8.6 8.9       UA RESULTS:   Recent Labs   Lab Test 04/13/22  1341 03/10/22  0957 04/07/21  1920   SG 1.008 1.013 1.017   URINEPH 6.0 6.0 5.0   NITRITE Negative " Negative Negative   RBCU 14* >182* 82*   WBCU >182* >182* >182*         Imaging:    I personally reviewed all applicable imaging and went over findings with patient.    Significant for:  CT ABDOMEN/PELVIS WITHOUT CONTRAST March 10, 2022 11:00 AM     CLINICAL HISTORY: Hematuria, unknown cause.     TECHNIQUE: CT scan of the abdomen and pelvis was performed without IV  contrast. Multiplanar reformats were obtained. Dose reduction  techniques were used.  CONTRAST: None.     COMPARISON: 11/7/2020.     FINDINGS:   LOWER CHEST: Normal.     HEPATOBILIARY: Unchanged hypodense liver lesions presumed cysts or  hemangiomas. No specific follow-up necessary. Gallbladder  unremarkable.     PANCREAS: Normal.     SPLEEN: Normal.     ADRENAL GLANDS: Normal.     KIDNEYS/BLADDER: Moderate bilateral hydronephrosis and hydroureter. No  contour deforming renal masses. Calcifications associated with the  left renal hilum appear to be vascular. There is abnormal thickening  of the anterior urinary bladder wall that measures up to 1.7 cm. There  are multiple bladder diverticula. Wispy high density in the inferior  bladder compatible with hematuria.     BOWEL: There are a few scattered clonic diverticula but no evidence of  diverticulitis. No bowel obstruction or inflammatory change. Normal  appendix.     LYMPH NODES: Normal.     VASCULATURE: Nonaneurysmal aortoiliac atherosclerosis.     PELVIC ORGANS: Enlarged prostate measures up to 5.6 cm transversely.     OTHER: No ascites.     MUSCULOSKELETAL: No destructive bone lesions.                                                                      IMPRESSION:   1.  Abnormal thickening of the anterior urinary bladder wall could  represent malignancy. Cystoscopy suggested.  2.  Numerous urinary bladder diverticula presumably related to chronic  outlet obstruction due to enlarged prostate.  3.  Bilateral hydronephrosis and hydroureter without evidence of  ureteral obstruction.     GENOVEVA TEMPLETON,  MD            Assessment and Plan:     Assessment: 89 year old male with a history of sinus bradycardia, HTM, glaucoma, BPH with LUTS, urethral stricture, and bladder cancer who presents for evaluation of urinary retention.  He has not had an issue with his catheter thus far.  He did have some blood in the catheter bag for 1 to 2 days after placement, but otherwise has had clear yellow urine.     He would like to try to get his Gupta catheter removed.  He still has about 1 week of antibiotics for presumed prostatitis as prescribed by the ED.  We discussed that he likely has chronic bladder outlet obstruction and a trial of void may not be successful.  We discussed tamsulosin to try to improve his chances of urinating without the catheter, however the patient agreed that this may not be safe given his limited mobility and baseline lightheadedness.    Plan:  Patient will finish his course of antibiotics on 4/27/2022.  We will attempt a trial of void early the following week.  His urine culture was negative, so I feel it is safe to proceed with this.  Patient is aware that if he has not able to urinate on his own, he will likely need a Gupta catheter placement again.    DONNA FRAUSTO PA-C  Department of Urology

## 2022-04-20 ENCOUNTER — OFFICE VISIT (OUTPATIENT)
Dept: UROLOGY | Facility: CLINIC | Age: 87
End: 2022-04-20
Payer: COMMERCIAL

## 2022-04-20 VITALS
DIASTOLIC BLOOD PRESSURE: 67 MMHG | HEIGHT: 71 IN | SYSTOLIC BLOOD PRESSURE: 109 MMHG | WEIGHT: 150 LBS | BODY MASS INDEX: 21 KG/M2 | HEART RATE: 71 BPM | TEMPERATURE: 97 F

## 2022-04-20 DIAGNOSIS — N13.8 BENIGN PROSTATIC HYPERPLASIA WITH URINARY OBSTRUCTION: Primary | ICD-10-CM

## 2022-04-20 DIAGNOSIS — N40.1 BENIGN PROSTATIC HYPERPLASIA WITH URINARY OBSTRUCTION: Primary | ICD-10-CM

## 2022-04-20 PROCEDURE — 99213 OFFICE O/P EST LOW 20 MIN: CPT | Performed by: STUDENT IN AN ORGANIZED HEALTH CARE EDUCATION/TRAINING PROGRAM

## 2022-04-20 ASSESSMENT — PAIN SCALES - GENERAL: PAINLEVEL: NO PAIN (0)

## 2022-04-20 NOTE — NURSING NOTE
"Initial /67 (BP Location: Left arm, Patient Position: Sitting, Cuff Size: Adult Regular)   Pulse 71   Temp 97  F (36.1  C) (Tympanic)   Ht 1.791 m (5' 10.5\")   Wt 68 kg (150 lb)   BMI 21.22 kg/m   Estimated body mass index is 21.22 kg/m  as calculated from the following:    Height as of this encounter: 1.791 m (5' 10.5\").    Weight as of this encounter: 68 kg (150 lb). .    Haily Granger LPN on 4/20/2022 at 10:47 AM    "

## 2022-05-02 ENCOUNTER — ALLIED HEALTH/NURSE VISIT (OUTPATIENT)
Dept: UROLOGY | Facility: CLINIC | Age: 87
End: 2022-05-02
Payer: COMMERCIAL

## 2022-05-02 DIAGNOSIS — N13.8 BENIGN PROSTATIC HYPERPLASIA WITH URINARY OBSTRUCTION: Primary | ICD-10-CM

## 2022-05-02 DIAGNOSIS — N40.1 BENIGN PROSTATIC HYPERPLASIA WITH URINARY OBSTRUCTION: Primary | ICD-10-CM

## 2022-05-02 PROCEDURE — 87186 SC STD MICRODIL/AGAR DIL: CPT

## 2022-05-02 PROCEDURE — 87086 URINE CULTURE/COLONY COUNT: CPT

## 2022-05-02 PROCEDURE — 51702 INSERT TEMP BLADDER CATH: CPT

## 2022-05-02 PROCEDURE — 87088 URINE BACTERIA CULTURE: CPT

## 2022-05-02 RX ORDER — FINASTERIDE 5 MG/1
5 TABLET, FILM COATED ORAL DAILY
Qty: 90 TABLET | Refills: 3 | Status: SHIPPED | OUTPATIENT
Start: 2022-05-02 | End: 2023-01-01

## 2022-05-02 NOTE — NURSING NOTE
Patient here for trial office void.  Pt did not tolerate bladder instillation. complaint of pain almost immediate.  Catheter successfully removed at 11:02 AM without immediate complication.  Pt unable to void and per Matilda CASTILLO.  Catheter reinserted   Catheter insertion documentation on 5/2/2022:  Catheter successfully inserted into the urethral meatus in the usual sterile fashion without immediate complication.  Type of catheter placed: 16 [place] catheter  Urine is clear in color.  Balloon was filled with 10cc's of normal saline.  Securement device placed for the catheter. Clasp and soft wrap and provided leg bag with appt for cysto per PG.    The patient tolerated the procedure and was instructed to follow up with their PCP or consultant as planned    Arianna Sanchez RN

## 2022-05-04 LAB — BACTERIA UR CULT: ABNORMAL

## 2022-05-05 DIAGNOSIS — T83.511A URINARY TRACT INFECTION ASSOCIATED WITH INDWELLING URETHRAL CATHETER, INITIAL ENCOUNTER (H): ICD-10-CM

## 2022-05-05 DIAGNOSIS — N39.0 URINARY TRACT INFECTION ASSOCIATED WITH INDWELLING URETHRAL CATHETER, INITIAL ENCOUNTER (H): ICD-10-CM

## 2022-05-05 DIAGNOSIS — N13.8 BENIGN PROSTATIC HYPERPLASIA WITH URINARY OBSTRUCTION: Primary | ICD-10-CM

## 2022-05-05 DIAGNOSIS — N40.1 BENIGN PROSTATIC HYPERPLASIA WITH URINARY OBSTRUCTION: Primary | ICD-10-CM

## 2022-05-05 DIAGNOSIS — R33.9 RETENTION OF URINE, UNSPECIFIED: ICD-10-CM

## 2022-05-05 RX ORDER — NITROFURANTOIN 25; 75 MG/1; MG/1
100 CAPSULE ORAL 2 TIMES DAILY
Qty: 14 CAPSULE | Refills: 0 | Status: SHIPPED | OUTPATIENT
Start: 2022-05-05 | End: 2022-05-12

## 2022-05-24 ENCOUNTER — OFFICE VISIT (OUTPATIENT)
Dept: UROLOGY | Facility: CLINIC | Age: 87
End: 2022-05-24
Payer: COMMERCIAL

## 2022-05-24 VITALS
DIASTOLIC BLOOD PRESSURE: 60 MMHG | WEIGHT: 149.91 LBS | HEIGHT: 71 IN | BODY MASS INDEX: 20.99 KG/M2 | OXYGEN SATURATION: 100 % | TEMPERATURE: 97.2 F | SYSTOLIC BLOOD PRESSURE: 98 MMHG | HEART RATE: 57 BPM

## 2022-05-24 DIAGNOSIS — R33.9 URINARY RETENTION: ICD-10-CM

## 2022-05-24 DIAGNOSIS — N39.0 URINARY TRACT INFECTION ASSOCIATED WITH INDWELLING URETHRAL CATHETER, INITIAL ENCOUNTER (H): Primary | ICD-10-CM

## 2022-05-24 DIAGNOSIS — T83.511A URINARY TRACT INFECTION ASSOCIATED WITH INDWELLING URETHRAL CATHETER, INITIAL ENCOUNTER (H): Primary | ICD-10-CM

## 2022-05-24 LAB
ALBUMIN UR-MCNC: 30 MG/DL
APPEARANCE UR: ABNORMAL
BACTERIA #/AREA URNS HPF: ABNORMAL /HPF
BILIRUB UR QL STRIP: NEGATIVE
COLOR UR AUTO: YELLOW
GLUCOSE UR STRIP-MCNC: NEGATIVE MG/DL
HGB UR QL STRIP: ABNORMAL
KETONES UR STRIP-MCNC: NEGATIVE MG/DL
LEUKOCYTE ESTERASE UR QL STRIP: ABNORMAL
NITRATE UR QL: NEGATIVE
PH UR STRIP: 6 [PH] (ref 5–7)
RBC #/AREA URNS AUTO: ABNORMAL /HPF
SP GR UR STRIP: 1.02 (ref 1–1.03)
UROBILINOGEN UR STRIP-ACNC: 0.2 E.U./DL
WBC #/AREA URNS AUTO: >100 /HPF

## 2022-05-24 PROCEDURE — 87086 URINE CULTURE/COLONY COUNT: CPT | Performed by: UROLOGY

## 2022-05-24 PROCEDURE — 99213 OFFICE O/P EST LOW 20 MIN: CPT | Mod: 25 | Performed by: UROLOGY

## 2022-05-24 PROCEDURE — 87186 SC STD MICRODIL/AGAR DIL: CPT | Performed by: UROLOGY

## 2022-05-24 PROCEDURE — 51702 INSERT TEMP BLADDER CATH: CPT | Performed by: UROLOGY

## 2022-05-24 PROCEDURE — 87088 URINE BACTERIA CULTURE: CPT | Performed by: UROLOGY

## 2022-05-24 PROCEDURE — 81001 URINALYSIS AUTO W/SCOPE: CPT | Performed by: UROLOGY

## 2022-05-24 NOTE — NURSING NOTE
Catheter removal documentation on 5/24/2022:    Gabriel Esposito presents to the clinic for catheter removal.  Reason for removal: need to check for a urinary tract infection  Order has been verified. Dr Wenceslao alvarez  Catheter successfully removed at 2:00 PM without immediate complication.  100 cc's of urine present in the catheter bag.  Urethral meatus is free of secretions and encrustation.  The patient is afebrile.  The patient tolerated the procedure       Catheter insertion documentation on 5/24/2022:    Gabriel Esposito presents to the clinic for catheter insertion.  Reason for insertion: unable to urinate  Order has been verified. antonio by Dr. Billy  Catheter successfully inserted into the urethral meatus in the usual sterile fashion without immediate complication.  Type of catheter placed: 16 Kinyarwanda indwelling catheter  Urine is cloudy in color.  40 cc's of urine output returned.  Balloon was filled with 10cc's of normal saline.  Securement device placed for the catheter.  Urine sample was collected and sent to the lab  The patient tolerated the procedure and was instructed to return or call for pain, fever, leakage or decreased urine flow    Jodi Sanchez, CMA

## 2022-05-24 NOTE — PROGRESS NOTES
Appointment source: Established Patient  Patient name: Gabriel Esposito  Urology Staff: James Billy MD    Subjective: This is a 89 year old year old male returning for follow up of urinary retention and a history of bladder cancer.    His urine has been particularly cloudy over the last week despite a recent course of antibiotics for bacteriuria.    No report of gross hematuria.    He does have a history of bladder cancer including treatment with BCG.    No additional data is available at this time regarding the grade or volume of the tumor.    Objective: Urine today was opaque and brenner brown in color suggestive of potential infection and possible some hematuria.    It was elected to exchange his catheter and obtain a new urine specimen for urine culture.    Assessment: History of bladder cancer and ongoing management of urinary retention by indwelling Gupta catheter.    Plan: Urine culture will be reviewed when available and the urine will be rendered sterile at which time cystoscopy will be undertaken for follow-up of his bladder cancer.    Total time 10 minutes

## 2022-05-24 NOTE — NURSING NOTE
"Initial BP 98/60 (BP Location: Right arm, Patient Position: Sitting, Cuff Size: Adult Regular)   Pulse 57   Temp 97.2  F (36.2  C) (Tympanic)   Ht 1.791 m (5' 10.5\")   Wt 68 kg (149 lb 14.6 oz)   SpO2 100%   BMI 21.21 kg/m   Estimated body mass index is 21.21 kg/m  as calculated from the following:    Height as of this encounter: 1.791 m (5' 10.5\").    Weight as of this encounter: 68 kg (149 lb 14.6 oz). .    Jodi Sanchez MA    "

## 2022-05-26 LAB
BACTERIA UR CULT: ABNORMAL
BACTERIA UR CULT: ABNORMAL

## 2022-05-27 ENCOUNTER — TELEPHONE (OUTPATIENT)
Dept: UROLOGY | Facility: CLINIC | Age: 87
End: 2022-05-27
Payer: COMMERCIAL

## 2022-05-27 DIAGNOSIS — N39.0 URINARY TRACT INFECTION ASSOCIATED WITH INDWELLING URETHRAL CATHETER, INITIAL ENCOUNTER (H): Primary | ICD-10-CM

## 2022-05-27 DIAGNOSIS — T83.511A URINARY TRACT INFECTION ASSOCIATED WITH INDWELLING URETHRAL CATHETER, INITIAL ENCOUNTER (H): Primary | ICD-10-CM

## 2022-05-27 RX ORDER — NITROFURANTOIN 25; 75 MG/1; MG/1
100 CAPSULE ORAL 2 TIMES DAILY
Qty: 14 CAPSULE | Refills: 0 | Status: SHIPPED | OUTPATIENT
Start: 2022-05-27 | End: 2022-06-03

## 2022-05-27 NOTE — TELEPHONE ENCOUNTER
Anali calling checking on urine culture results.  See results.  Positive culture.  Anali reports Gabriel had a little blood in his urine last night.  He is not running a fever, not having any pain, will have burning on occasion.  She reports that he rarely complains.      Please call with treatment plan.  Pt uses [facility] in [place].    Janay Quigley  [facility] RN

## 2022-05-31 DIAGNOSIS — Z87.440 PERSONAL HISTORY OF URINARY TRACT INFECTION: Primary | ICD-10-CM

## 2022-05-31 RX ORDER — PENICILLIN V POTASSIUM 500 MG/1
500 TABLET, FILM COATED ORAL 2 TIMES DAILY
Qty: 14 TABLET | Refills: 0 | Status: SHIPPED | OUTPATIENT
Start: 2022-05-31 | End: 2023-01-01

## 2022-06-13 ENCOUNTER — TELEPHONE (OUTPATIENT)
Dept: UROLOGY | Facility: CLINIC | Age: 87
End: 2022-06-13
Payer: COMMERCIAL

## 2022-06-13 DIAGNOSIS — T83.511A URINARY TRACT INFECTION ASSOCIATED WITH INDWELLING URETHRAL CATHETER, INITIAL ENCOUNTER (H): Primary | ICD-10-CM

## 2022-06-13 DIAGNOSIS — N39.0 URINARY TRACT INFECTION ASSOCIATED WITH INDWELLING URETHRAL CATHETER, INITIAL ENCOUNTER (H): Primary | ICD-10-CM

## 2022-06-13 NOTE — TELEPHONE ENCOUNTER
[facility] Call Center    Phone Message    May a detailed message be left on voicemail: yes     Reason for Call: Other: Franco Briones's wife is calling stating that he has covid and can't do his cysto until next week. Anali would like a call back on what she should do and soon rescheduling options, thanks!      Action Taken: Other: [facility] [facility]    Travel Screening: Not Applicable

## 2022-06-16 RX ORDER — NITROFURANTOIN 25; 75 MG/1; MG/1
100 CAPSULE ORAL DAILY
Qty: 12 CAPSULE | Refills: 0 | Status: SHIPPED | OUTPATIENT
Start: 2022-06-16 | End: 2023-01-01

## 2022-06-16 NOTE — TELEPHONE ENCOUNTER
Anali calling asking if he needs to be taking Penicillin again before his cysto on 6/27. Please call her back to discuss, thanks!

## 2022-06-27 ENCOUNTER — TELEPHONE (OUTPATIENT)
Dept: FAMILY MEDICINE | Facility: CLINIC | Age: 87
End: 2022-06-27

## 2022-06-27 NOTE — TELEPHONE ENCOUNTER
"Wife says pt was supposed to have a urology appt today with Dr Billy but pt cancelled it as he has diarrhea. Wife says pt is not having diarrhea, he also is not drinking enough water, and  has decreased urine output  in leg bag, she says the urine is very strong smelling and is cloudy.  This RN also spoke to pt, he said he has had diarrhea since Sunday and he has been taking immodium  he says he empties the leg bag about every 6 hours, there is currently about 6\" of light yellow urine in bag.  Pt says there is adequate urine output. Pt says he feels fine. Wife says she doesn't know what to do with pt as he is very stubborn and lies about his health, she says she can't care for him at home anymore. They are currently living in assisted living in [place]. She was offered the Senior Hotline number but she says she already has that. She was advised to call 911 if pt develops a fever or become confused. Jayne Delcid RN     "

## 2022-07-06 ENCOUNTER — HOSPITAL ENCOUNTER (EMERGENCY)
Facility: CLINIC | Age: 87
Discharge: HOME | End: 2022-07-06
Attending: EMERGENCY MEDICINE | Admitting: EMERGENCY MEDICINE
Payer: COMMERCIAL

## 2022-07-06 VITALS
SYSTOLIC BLOOD PRESSURE: 188 MMHG | BODY MASS INDEX: 21.08 KG/M2 | OXYGEN SATURATION: 99 % | TEMPERATURE: 98.2 F | WEIGHT: 149 LBS | DIASTOLIC BLOOD PRESSURE: 84 MMHG | HEART RATE: 64 BPM | RESPIRATION RATE: 16 BRPM

## 2022-07-06 DIAGNOSIS — T83.511A URINARY TRACT INFECTION ASSOCIATED WITH INDWELLING URETHRAL CATHETER, INITIAL ENCOUNTER (H): ICD-10-CM

## 2022-07-06 DIAGNOSIS — N39.0 URINARY TRACT INFECTION ASSOCIATED WITH INDWELLING URETHRAL CATHETER, INITIAL ENCOUNTER (H): ICD-10-CM

## 2022-07-06 LAB
ALBUMIN SERPL-MCNC: 3.6 G/DL (ref 3.4–5)
ALBUMIN UR-MCNC: 30 MG/DL
ALP SERPL-CCNC: 46 U/L (ref 40–150)
ALT SERPL W P-5'-P-CCNC: 12 U/L (ref 0–70)
ANION GAP SERPL CALCULATED.3IONS-SCNC: 5 MMOL/L (ref 3–14)
APPEARANCE UR: ABNORMAL
AST SERPL W P-5'-P-CCNC: 14 U/L (ref 0–45)
BASOPHILS # BLD AUTO: 0.1 10E3/UL (ref 0–0.2)
BASOPHILS NFR BLD AUTO: 1 %
BILIRUB SERPL-MCNC: 0.9 MG/DL (ref 0.2–1.3)
BILIRUB UR QL STRIP: NEGATIVE
BUN SERPL-MCNC: 16 MG/DL (ref 7–30)
CALCIUM SERPL-MCNC: 8.5 MG/DL (ref 8.5–10.1)
CHLORIDE BLD-SCNC: 106 MMOL/L (ref 94–109)
CO2 SERPL-SCNC: 27 MMOL/L (ref 20–32)
COLOR UR AUTO: YELLOW
CREAT SERPL-MCNC: 0.97 MG/DL (ref 0.66–1.25)
EOSINOPHIL # BLD AUTO: 0.1 10E3/UL (ref 0–0.7)
EOSINOPHIL NFR BLD AUTO: 1 %
ERYTHROCYTE [DISTWIDTH] IN BLOOD BY AUTOMATED COUNT: 13.3 % (ref 10–15)
GFR SERPL CREATININE-BSD FRML MDRD: 71 ML/MIN/1.73M2
GLUCOSE BLD-MCNC: 109 MG/DL (ref 70–99)
GLUCOSE UR STRIP-MCNC: NEGATIVE MG/DL
HCT VFR BLD AUTO: 38.1 % (ref 40–53)
HGB BLD-MCNC: 12.3 G/DL (ref 13.3–17.7)
HGB UR QL STRIP: ABNORMAL
HOLD SPECIMEN: NORMAL
HOLD SPECIMEN: NORMAL
IMM GRANULOCYTES # BLD: 0 10E3/UL
IMM GRANULOCYTES NFR BLD: 0 %
KETONES UR STRIP-MCNC: NEGATIVE MG/DL
LACTATE SERPL-SCNC: 1.1 MMOL/L (ref 0.7–2)
LEUKOCYTE ESTERASE UR QL STRIP: ABNORMAL
LYMPHOCYTES # BLD AUTO: 2.3 10E3/UL (ref 0.8–5.3)
LYMPHOCYTES NFR BLD AUTO: 24 %
MCH RBC QN AUTO: 31.2 PG (ref 26.5–33)
MCHC RBC AUTO-ENTMCNC: 32.3 G/DL (ref 31.5–36.5)
MCV RBC AUTO: 97 FL (ref 78–100)
MONOCYTES # BLD AUTO: 0.8 10E3/UL (ref 0–1.3)
MONOCYTES NFR BLD AUTO: 8 %
MUCOUS THREADS #/AREA URNS LPF: PRESENT /LPF
NEUTROPHILS # BLD AUTO: 6.2 10E3/UL (ref 1.6–8.3)
NEUTROPHILS NFR BLD AUTO: 66 %
NITRATE UR QL: NEGATIVE
NRBC # BLD AUTO: 0 10E3/UL
NRBC BLD AUTO-RTO: 0 /100
PH UR STRIP: 5 [PH] (ref 5–7)
PLATELET # BLD AUTO: 235 10E3/UL (ref 150–450)
POTASSIUM BLD-SCNC: 3.9 MMOL/L (ref 3.4–5.3)
PROT SERPL-MCNC: 7.4 G/DL (ref 6.8–8.8)
RBC # BLD AUTO: 3.94 10E6/UL (ref 4.4–5.9)
RBC URINE: 112 /HPF
SODIUM SERPL-SCNC: 138 MMOL/L (ref 133–144)
SP GR UR STRIP: 1.01 (ref 1–1.03)
UROBILINOGEN UR STRIP-MCNC: NORMAL MG/DL
WBC # BLD AUTO: 9.4 10E3/UL (ref 4–11)
WBC CLUMPS #/AREA URNS HPF: PRESENT /HPF
WBC URINE: >182 /HPF

## 2022-07-06 PROCEDURE — 99284 EMERGENCY DEPT VISIT MOD MDM: CPT | Performed by: EMERGENCY MEDICINE

## 2022-07-06 PROCEDURE — 82040 ASSAY OF SERUM ALBUMIN: CPT | Performed by: EMERGENCY MEDICINE

## 2022-07-06 PROCEDURE — 250N000013 HC RX MED GY IP 250 OP 250 PS 637: Performed by: EMERGENCY MEDICINE

## 2022-07-06 PROCEDURE — 87088 URINE BACTERIA CULTURE: CPT | Performed by: EMERGENCY MEDICINE

## 2022-07-06 PROCEDURE — 85004 AUTOMATED DIFF WBC COUNT: CPT | Performed by: EMERGENCY MEDICINE

## 2022-07-06 PROCEDURE — 80053 COMPREHEN METABOLIC PANEL: CPT | Performed by: EMERGENCY MEDICINE

## 2022-07-06 PROCEDURE — 99283 EMERGENCY DEPT VISIT LOW MDM: CPT

## 2022-07-06 PROCEDURE — 81001 URINALYSIS AUTO W/SCOPE: CPT | Performed by: EMERGENCY MEDICINE

## 2022-07-06 PROCEDURE — 83605 ASSAY OF LACTIC ACID: CPT | Performed by: EMERGENCY MEDICINE

## 2022-07-06 PROCEDURE — 36415 COLL VENOUS BLD VENIPUNCTURE: CPT | Performed by: EMERGENCY MEDICINE

## 2022-07-06 RX ORDER — NITROFURANTOIN 25; 75 MG/1; MG/1
100 CAPSULE ORAL EVERY 12 HOURS SCHEDULED
Status: DISCONTINUED | OUTPATIENT
Start: 2022-07-06 | End: 2022-07-06

## 2022-07-06 RX ORDER — NITROFURANTOIN 25; 75 MG/1; MG/1
100 CAPSULE ORAL 2 TIMES DAILY
Qty: 28 CAPSULE | Refills: 0 | Status: SHIPPED | OUTPATIENT
Start: 2022-07-06 | End: 2022-07-20

## 2022-07-06 RX ORDER — NITROFURANTOIN 25; 75 MG/1; MG/1
100 CAPSULE ORAL EVERY 12 HOURS SCHEDULED
Status: DISCONTINUED | OUTPATIENT
Start: 2022-07-06 | End: 2022-07-06 | Stop reason: HOSPADM

## 2022-07-06 RX ADMIN — NITROFURANTOIN (MONOHYDRATE/MACROCRYSTALS) 100 MG: 75; 25 CAPSULE ORAL at 16:13

## 2022-07-06 NOTE — DISCHARGE INSTRUCTIONS
Macrobid as prescribed x2 weeks    Drink plenty fluids    Follow-up urology    Return here for fever, vomiting, altered mental status or any other concern.

## 2022-07-06 NOTE — ED TRIAGE NOTES
Noted urine dark in golden bag with some clots, having some bladder discomfort, slight dizziness, states catheter changed less than a month, states last time cath removed student nurse forgot to deflate balloon so got torn up inside from it, has had some discomfort since     Triage Assessment     Row Name 07/06/22 8703       Triage Assessment (Adult)    Airway WDL WDL       Respiratory WDL    Respiratory WDL WDL       Skin Circulation/Temperature WDL    Skin Circulation/Temperature WDL WDL       Peripheral/Neurovascular WDL    Peripheral Neurovascular WDL WDL       Cognitive/Neuro/Behavioral WDL    Cognitive/Neuro/Behavioral WDL WDL

## 2022-07-06 NOTE — ED PROVIDER NOTES
History     Chief Complaint   Patient presents with     Hematuria     HPI  Gabriel Espostio is a 89 year old male who presents from assisted living with stepdaughter secondary to gross hematuria with clots.  Denies abdominal pain or fullness.  Denies penile pain.  No fever but reports chills and sweats especially overnight over the past few days.  No vomiting diarrhea or constipation.  He is not on antiplatelet or anticoagulation therapy.  He describes feeling a bit lightheaded and it his appetite is diminished.  He has known bladder cancer, chronic indwelling Gupta catheter.  Recent notes from urology reviewed in [facility].  He has had recurrent urinary tract infection recently with Enterococcus and has been treated with Macrobid.    Allergies:  No Known Allergies    Problem List:    Patient Active Problem List    Diagnosis Date Noted     Cataract 01/12/2021     Priority: Medium     Formatting of this note might be different from the original.  Created by Conversion  Formatting of this note might be different from the original.  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     Priority: Medium     Hematuria 09/29/2020     Priority: Medium     Sepsis (H) 08/14/2020     Priority: Medium     Benign prostatic hyperplasia with lower urinary tract symptoms 05/13/2019     Priority: Medium     Difficulty in walking, not elsewhere classified 05/13/2019     Priority: Medium     Fluid overload, unspecified 05/13/2019     Priority: Medium     Gastro-esophageal reflux disease without esophagitis 05/13/2019     Priority: Medium     Low back pain 05/13/2019     Priority: Medium     Muscle weakness (generalized) 05/13/2019     Priority: Medium     Other abnormal glucose 05/13/2019     Priority: Medium     Formatting of this note might be different from the original.  Created by Conversion       Other hyperlipidemia 05/13/2019     Priority: Medium     Formatting of this note might be different from the  original.  Created by Conversion       Pain in unspecified hip 05/13/2019     Priority: Medium     Retention of urine, unspecified 05/13/2019     Priority: Medium     Sinus bradycardia 05/13/2019     Priority: Medium     Unspecified glaucoma 05/13/2019     Priority: Medium     Formatting of this note might be different from the original.  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) 03/04/2016     Priority: Medium        Past Medical History:    Past Medical History:   Diagnosis Date     Arthritis      Benign bladder tumor      GERD (gastroesophageal reflux disease)      Glaucoma      Hemorrhoids      Hyperlipidemia      Hypertension      Prostatitis      Urgency of urination        Past Surgical History:    Past Surgical History:   Procedure Laterality Date     BLADDER SURGERY      removal of benign tumor     CYSTOSCOPY N/A 9/29/2020    Procedure: CYSTOURETHROSCOPY WITH FULGURATION RESECTION OF BLADDER TUMOR;  Surgeon: Alejandro Mcdonough MD;  [place] [facility] [place]     CYSTOURETHROSCOPY  09/29/2020     EYE SURGERY Bilateral     Cataract     HEMORRHOIDECTOMY INTERNAL LIGATION      Description: Hemorrhoidectomy;  Recorded: 04/09/2010;       Family History:    Family History   Problem Relation Age of Onset     Diabetes Mother        Social History:  Marital Status:   [2]  Social History     Tobacco Use     Smoking status: Former Smoker     Smokeless tobacco: Never Used     Tobacco comment: quit smoking in 1983   Substance Use Topics     Alcohol use: No     Drug use: No        Medications:    nitroFURantoin macrocrystal-monohydrate (MACROBID) 100 MG capsule  Acetaminophen (TYLENOL PO)  brimonidine (ALPHAGAN-P)  0.15 % ophthalmic solution  dorzolamide-timolol PF (COSOPT) 22.3-6.8 MG/ML opthalmic solution  finasteride (PROSCAR) 5 MG tablet  latanoprost (XALATAN) 0.005 % ophthalmic solution  loperamide (IMODIUM) 2 MG capsule  menthol-zinc oxide (CALMOSEPTINE) 0.44-20.6 % OINT ointment  nitroFURantoin macrocrystal-monohydrate (MACROBID) 100 MG capsule  penicillin V (VEETID) 500 MG tablet          Review of Systems  All other systems reviewed and are negative.    Physical Exam   BP: (!) 190/84  Pulse: 65  Temp: 98.2  F (36.8  C)  Resp: 14  Weight: 67.6 kg (149 lb)  SpO2: 99 %      Physical Exam  Nontoxic-appearing no respiratory distress alert and oriented  Skin pink warm and dry  Head atraumatic normocephalic  Lungs clear  Heart regular no murmur  Abdomen soft nontender scaphoid nondistended bowel sounds present  Gupta catheter in place, no meatal breakdown no swelling or redness of the penis or glans.  Pink-tinged urine with some clots present.  ED Course                 Procedures              Critical Care time:  none               Results for orders placed or performed during the hospital encounter of 07/06/22 (from the past 24 hour(s))   UA with Microscopic reflex to Culture    Specimen: Urine, Catheter   Result Value Ref Range    Color Urine Yellow Colorless, Straw, Light Yellow, Yellow    Appearance Urine Cloudy (A) Clear    Glucose Urine Negative Negative mg/dL    Bilirubin Urine Negative Negative    Ketones Urine Negative Negative mg/dL    Specific Gravity Urine 1.010 1.003 - 1.035    Blood Urine Large (A) Negative    pH Urine 5.0 5.0 - 7.0    Protein Albumin Urine 30  (A) Negative mg/dL    Urobilinogen Urine Normal Normal, 2.0 mg/dL    Nitrite Urine Negative Negative    Leukocyte Esterase Urine Large (A) Negative    WBC Clumps Urine Present (A) None Seen /HPF    Mucus Urine Present (A) None Seen /LPF    RBC Urine 112 (H) <=2 /HPF    WBC Urine >182 (H) <=5 /HPF    Narrative    Urine Culture ordered based on laboratory  criteria   CBC with platelets differential    Narrative    The following orders were created for panel order CBC with platelets differential.  Procedure                               Abnormality         Status                     ---------                               -----------         ------                     CBC with platelets and d...[[id number]]  Abnormal            Final result                 Please view results for these tests on the individual orders.   Comprehensive metabolic panel   Result Value Ref Range    Sodium 138 133 - 144 mmol/L    Potassium 3.9 3.4 - 5.3 mmol/L    Chloride 106 94 - 109 mmol/L    Carbon Dioxide (CO2) 27 20 - 32 mmol/L    Anion Gap 5 3 - 14 mmol/L    Urea Nitrogen 16 7 - 30 mg/dL    Creatinine 0.97 0.66 - 1.25 mg/dL    Calcium 8.5 8.5 - 10.1 mg/dL    Glucose 109 (H) 70 - 99 mg/dL    Alkaline Phosphatase 46 40 - 150 U/L    AST 14 0 - 45 U/L    ALT 12 0 - 70 U/L    Protein Total 7.4 6.8 - 8.8 g/dL    Albumin 3.6 3.4 - 5.0 g/dL    Bilirubin Total 0.9 0.2 - 1.3 mg/dL    GFR Estimate 71 >60 mL/min/1.73m2   Lactic acid whole blood   Result Value Ref Range    Lactic Acid 1.1 0.7 - 2.0 mmol/L   [place] Draw    Narrative    The following orders were created for panel order [place] Draw.  Procedure                               Abnormality         Status                     ---------                               -----------         ------                     Extra Blue Top Tube[[id number]]                                                         Extra Green Top (Lithium...[[id number]]                      Final result               Extra Purple Top Tube[[id number]]                            Final result                 Please view results for these tests on the individual orders.   CBC with platelets and differential   Result Value Ref Range    WBC Count 9.4 4.0 - 11.0 10e3/uL    RBC Count 3.94 (L) 4.40 - 5.90 10e6/uL    Hemoglobin 12.3 (L) 13.3 - 17.7 g/dL    Hematocrit 38.1 (L) 40.0 - 53.0  %    MCV 97 78 - 100 fL    MCH 31.2 26.5 - 33.0 pg    MCHC 32.3 31.5 - 36.5 g/dL    RDW 13.3 10.0 - 15.0 %    Platelet Count 235 150 - 450 10e3/uL    % Neutrophils 66 %    % Lymphocytes 24 %    % Monocytes 8 %    % Eosinophils 1 %    % Basophils 1 %    % Immature Granulocytes 0 %    NRBCs per 100 WBC 0 <1 /100    Absolute Neutrophils 6.2 1.6 - 8.3 10e3/uL    Absolute Lymphocytes 2.3 0.8 - 5.3 10e3/uL    Absolute Monocytes 0.8 0.0 - 1.3 10e3/uL    Absolute Eosinophils 0.1 0.0 - 0.7 10e3/uL    Absolute Basophils 0.1 0.0 - 0.2 10e3/uL    Absolute Immature Granulocytes 0.0 <=0.4 10e3/uL    Absolute NRBCs 0.0 10e3/uL   Extra Green Top (Lithium Heparin) Tube   Result Value Ref Range    Hold Specimen JIC    Extra Purple Top Tube   Result Value Ref Range    Hold Specimen JIC        Medications - No data to display    Assessments & Plan (with Medical Decision Making)  Gross hematuria, history of Enterococcus faecalis infections, urinalysis appears grossly infected.  Catheter was changed out and irrigated until clear light yellow urine returned.  Patient tolerated procedure without any complication.  His lab work-up is unrevealing and reassuring.  No evidence for sepsis.  Safe to discharge home.  2 weeks Macrobid.  Follow-up urology.  Return criteria reviewed     I have reviewed the nursing notes.    I have reviewed the findings, diagnosis, plan and need for follow up with the patient.          Discharge Medication List as of 7/6/2022  4:49 PM          Final diagnoses:   Urinary tract infection associated with indwelling urethral catheter, initial encounter (H)       7/6/2022   [facility] EMERGENCY DEPT     Walter Duffy MD  07/06/22 0862

## 2022-07-07 NOTE — RESULT ENCOUNTER NOTE
[facility] Emergency Dept discharge antibiotic (if prescribed): Nitrofurantoin Macrocrystal-Monohydrate (Macrobid) 100 mg PO capsule, 1 capsule (100 mg) by mouth 2 times daily for 14 days   Date of Rx (if applicable):  7/6/22  No changes in treatment per [facility] ED Lab Result Urine culture protocol.

## 2022-07-08 LAB
BACTERIA UR CULT: ABNORMAL
BACTERIA UR CULT: ABNORMAL

## 2022-08-30 ENCOUNTER — MEDICAL CORRESPONDENCE (OUTPATIENT)
Dept: UROLOGY | Facility: CLINIC | Age: 87
End: 2022-08-30

## 2022-09-12 ENCOUNTER — MEDICAL CORRESPONDENCE (OUTPATIENT)
Dept: HEALTH INFORMATION MANAGEMENT | Facility: CLINIC | Age: 87
End: 2022-09-12

## 2022-09-20 ENCOUNTER — MEDICAL CORRESPONDENCE (OUTPATIENT)
Dept: UROLOGY | Facility: CLINIC | Age: 87
End: 2022-09-20

## 2022-10-04 ENCOUNTER — MEDICAL CORRESPONDENCE (OUTPATIENT)
Dept: UROLOGY | Facility: CLINIC | Age: 87
End: 2022-10-04

## 2022-10-10 ENCOUNTER — APPOINTMENT (OUTPATIENT)
Dept: CT IMAGING | Facility: CLINIC | Age: 87
End: 2022-10-10
Attending: EMERGENCY MEDICINE
Payer: COMMERCIAL

## 2022-10-10 ENCOUNTER — HOSPITAL ENCOUNTER (EMERGENCY)
Facility: CLINIC | Age: 87
Discharge: HOME | End: 2022-10-10
Attending: EMERGENCY MEDICINE | Admitting: EMERGENCY MEDICINE
Payer: COMMERCIAL

## 2022-10-10 VITALS
HEIGHT: 71 IN | HEART RATE: 63 BPM | TEMPERATURE: 97.2 F | SYSTOLIC BLOOD PRESSURE: 206 MMHG | DIASTOLIC BLOOD PRESSURE: 93 MMHG | RESPIRATION RATE: 18 BRPM | OXYGEN SATURATION: 99 % | WEIGHT: 145 LBS | BODY MASS INDEX: 20.3 KG/M2

## 2022-10-10 DIAGNOSIS — I65.22 CAROTID STENOSIS, ASYMPTOMATIC, LEFT: ICD-10-CM

## 2022-10-10 DIAGNOSIS — I10 HYPERTENSION, UNSPECIFIED TYPE: ICD-10-CM

## 2022-10-10 DIAGNOSIS — R07.89 CHEST DISCOMFORT: ICD-10-CM

## 2022-10-10 LAB
ANION GAP SERPL CALCULATED.3IONS-SCNC: 11 MMOL/L (ref 7–15)
BASOPHILS # BLD AUTO: 0 10E3/UL (ref 0–0.2)
BASOPHILS NFR BLD AUTO: 1 %
BUN SERPL-MCNC: 19 MG/DL (ref 8–23)
CALCIUM SERPL-MCNC: 9.1 MG/DL (ref 8.2–9.6)
CHLORIDE SERPL-SCNC: 105 MMOL/L (ref 98–107)
CREAT SERPL-MCNC: 1.14 MG/DL (ref 0.67–1.17)
DEPRECATED HCO3 PLAS-SCNC: 24 MMOL/L (ref 22–29)
EOSINOPHIL # BLD AUTO: 0.1 10E3/UL (ref 0–0.7)
EOSINOPHIL NFR BLD AUTO: 1 %
ERYTHROCYTE [DISTWIDTH] IN BLOOD BY AUTOMATED COUNT: 13.2 % (ref 10–15)
GFR SERPL CREATININE-BSD FRML MDRD: 58 ML/MIN/1.73M2
GLUCOSE SERPL-MCNC: 104 MG/DL (ref 70–99)
HCT VFR BLD AUTO: 39.1 % (ref 40–53)
HGB BLD-MCNC: 12.3 G/DL (ref 13.3–17.7)
IMM GRANULOCYTES # BLD: 0 10E3/UL
IMM GRANULOCYTES NFR BLD: 0 %
LYMPHOCYTES # BLD AUTO: 1.9 10E3/UL (ref 0.8–5.3)
LYMPHOCYTES NFR BLD AUTO: 24 %
MCH RBC QN AUTO: 30.6 PG (ref 26.5–33)
MCHC RBC AUTO-ENTMCNC: 31.5 G/DL (ref 31.5–36.5)
MCV RBC AUTO: 97 FL (ref 78–100)
MONOCYTES # BLD AUTO: 0.7 10E3/UL (ref 0–1.3)
MONOCYTES NFR BLD AUTO: 8 %
NEUTROPHILS # BLD AUTO: 5.4 10E3/UL (ref 1.6–8.3)
NEUTROPHILS NFR BLD AUTO: 66 %
NRBC # BLD AUTO: 0 10E3/UL
NRBC BLD AUTO-RTO: 0 /100
PLATELET # BLD AUTO: 185 10E3/UL (ref 150–450)
POTASSIUM SERPL-SCNC: 4.5 MMOL/L (ref 3.4–5.3)
RBC # BLD AUTO: 4.02 10E6/UL (ref 4.4–5.9)
SODIUM SERPL-SCNC: 140 MMOL/L (ref 136–145)
TROPONIN T SERPL HS-MCNC: 26 NG/L
TROPONIN T SERPL HS-MCNC: 30 NG/L
WBC # BLD AUTO: 8.2 10E3/UL (ref 4–11)

## 2022-10-10 PROCEDURE — 99285 EMERGENCY DEPT VISIT HI MDM: CPT | Mod: 25 | Performed by: EMERGENCY MEDICINE

## 2022-10-10 PROCEDURE — 93010 ELECTROCARDIOGRAM REPORT: CPT | Performed by: EMERGENCY MEDICINE

## 2022-10-10 PROCEDURE — 36415 COLL VENOUS BLD VENIPUNCTURE: CPT | Performed by: EMERGENCY MEDICINE

## 2022-10-10 PROCEDURE — 70496 CT ANGIOGRAPHY HEAD: CPT

## 2022-10-10 PROCEDURE — 85025 COMPLETE CBC W/AUTO DIFF WBC: CPT | Performed by: EMERGENCY MEDICINE

## 2022-10-10 PROCEDURE — 250N000011 HC RX IP 250 OP 636: Performed by: EMERGENCY MEDICINE

## 2022-10-10 PROCEDURE — 250N000013 HC RX MED GY IP 250 OP 250 PS 637: Performed by: EMERGENCY MEDICINE

## 2022-10-10 PROCEDURE — 84484 ASSAY OF TROPONIN QUANT: CPT | Performed by: EMERGENCY MEDICINE

## 2022-10-10 PROCEDURE — 70498 CT ANGIOGRAPHY NECK: CPT

## 2022-10-10 PROCEDURE — 258N000003 HC RX IP 258 OP 636: Performed by: EMERGENCY MEDICINE

## 2022-10-10 PROCEDURE — 250N000009 HC RX 250: Performed by: EMERGENCY MEDICINE

## 2022-10-10 PROCEDURE — 80048 BASIC METABOLIC PNL TOTAL CA: CPT | Performed by: EMERGENCY MEDICINE

## 2022-10-10 PROCEDURE — 70450 CT HEAD/BRAIN W/O DYE: CPT

## 2022-10-10 PROCEDURE — 93005 ELECTROCARDIOGRAM TRACING: CPT | Performed by: EMERGENCY MEDICINE

## 2022-10-10 RX ORDER — AMLODIPINE BESYLATE 2.5 MG/1
2.5 TABLET ORAL DAILY
Qty: 30 TABLET | Refills: 0 | Status: SHIPPED | OUTPATIENT
Start: 2022-10-10 | End: 2022-01-01

## 2022-10-10 RX ORDER — SODIUM CHLORIDE 9 MG/ML
1000 INJECTION, SOLUTION INTRAVENOUS CONTINUOUS
Status: DISCONTINUED | OUTPATIENT
Start: 2022-10-10 | End: 2022-10-10 | Stop reason: HOSPADM

## 2022-10-10 RX ORDER — IOPAMIDOL 755 MG/ML
68 INJECTION, SOLUTION INTRAVASCULAR ONCE
Status: COMPLETED | OUTPATIENT
Start: 2022-10-10 | End: 2022-10-10

## 2022-10-10 RX ORDER — ATORVASTATIN CALCIUM 40 MG/1
40 TABLET, FILM COATED ORAL DAILY
Qty: 30 TABLET | Refills: 0 | Status: SHIPPED | OUTPATIENT
Start: 2022-10-10 | End: 2022-01-01

## 2022-10-10 RX ORDER — CLONIDINE HYDROCHLORIDE 0.1 MG/1
0.1 TABLET ORAL ONCE
Status: COMPLETED | OUTPATIENT
Start: 2022-10-10 | End: 2022-10-10

## 2022-10-10 RX ADMIN — SODIUM CHLORIDE 100 ML: 9 INJECTION, SOLUTION INTRAVENOUS at 14:24

## 2022-10-10 RX ADMIN — SODIUM CHLORIDE 500 ML: 9 INJECTION, SOLUTION INTRAVENOUS at 12:54

## 2022-10-10 RX ADMIN — IOPAMIDOL 68 ML: 755 INJECTION, SOLUTION INTRAVENOUS at 14:25

## 2022-10-10 RX ADMIN — SODIUM CHLORIDE 1000 ML: 9 INJECTION, SOLUTION INTRAVENOUS at 12:56

## 2022-10-10 RX ADMIN — CLONIDINE HYDROCHLORIDE 0.1 MG: 0.1 TABLET ORAL at 17:07

## 2022-10-10 ASSESSMENT — ACTIVITIES OF DAILY LIVING (ADL)
ADLS_ACUITY_SCORE: 35

## 2022-10-10 ASSESSMENT — ENCOUNTER SYMPTOMS
CONSTITUTIONAL NEGATIVE: 1
HEMATOLOGIC/LYMPHATIC NEGATIVE: 1
GASTROINTESTINAL NEGATIVE: 1
ALLERGIC/IMMUNOLOGIC NEGATIVE: 1
NECK PAIN: 1
CARDIOVASCULAR NEGATIVE: 1
NEUROLOGICAL NEGATIVE: 1
ENDOCRINE NEGATIVE: 1
PSYCHIATRIC NEGATIVE: 1
CHEST TIGHTNESS: 1
EYES NEGATIVE: 1

## 2022-10-10 NOTE — ED PROVIDER NOTES
Emergency Department Patient Sign-out       Brief HPI:  This is a 89 year old male signed out to me by Dr. Rizo .  See initial ED Provider note for details of the presentation.     89-year-old sent from clinic with elevated blood pressure.  Nonischemic EKG.  Initial high-sensitivity troponin T slightly elevated to 30.  CT of head without contrast and CTA of head and neck pending.  Plan for outpatient management if no significant change in troponin and no findings on imaging requiring acute intervention.    Repeat troponin slightly lower at 26.  No chest pain while in the department.  CT head without acute abnormality.  CTA with severe stenosis of left carotid at 90%.  No symptoms related to this.  Discussed treatment options with patient and he has strong preference to go home.  Case discussed briefly with Dr. García from Vascular surgery and based on information presented did not recommend surgical intervention.  It is thought that patient needs to have an expected lifespan of 5 years or greater to appreciate benefit from carotid endarterectomy.  We will plan for medical management.  We will start patient on low-dose aspirin, 40 mg of atorvastatin and 2.5 mg amlodipine.  Patient was given 0.1 mg clonidine and had a 9 point drop in systolic blood pressure from 215 to 206.  Chart review shows that he had a systolic blood pressure of 190 in July of this year while he was in the emergency department for unrelated matter.    Patient and wife are both on board with plan of being discharged to home, starting new medications and following up closely with your primary care provider.  ED return precautions discussed.  Prescription sent to preferred pharmacy.         Exam:   Patient Vitals for the past 24 hrs:   BP Temp Temp src Pulse Resp SpO2 Height Weight   10/10/22 1638 (!) 215/97 -- -- -- -- 100 % -- --   10/10/22 1630 -- -- -- 66 -- 99 % -- --   10/10/22 1615 -- -- -- -- -- 99 % -- --   10/10/22 1603 (!)  "210/86 -- -- 70 -- 99 % -- --   10/10/22 1400 (!) 182/68 -- -- 54 -- 100 % -- --   10/10/22 1345 (!) 189/72 -- -- 60 -- 99 % -- --   10/10/22 1330 (!) 181/66 -- -- 56 -- 99 % -- --   10/10/22 1300 (!) 186/69 -- -- 56 -- 99 % -- --   10/10/22 1250 (!) 177/72 -- -- -- -- 99 % -- --   10/10/22 1230 (!) 182/74 -- -- 60 -- 99 % -- --   10/10/22 1200 (!) 181/73 -- -- 57 -- 99 % -- --   10/10/22 1147 (!) 203/79 -- -- 59 -- 99 % -- --   10/10/22 1127 (!) 194/77 97.2  F (36.2  C) Tympanic 66 18 100 % 1.803 m (5' 11\") 65.8 kg (145 lb)           ED RESULTS:   Results for orders placed or performed during the hospital encounter of 10/10/22 (from the past 24 hour(s))   CBC with platelets differential     Status: Abnormal    Collection Time: 10/10/22 12:53 PM    Narrative    The following orders were created for panel order CBC with platelets differential.  Procedure                               Abnormality         Status                     ---------                               -----------         ------                     CBC with platelets and d...[[id number]]  Abnormal            Final result                 Please view results for these tests on the individual orders.   Basic metabolic panel     Status: Abnormal    Collection Time: 10/10/22 12:53 PM   Result Value Ref Range    Sodium 140 136 - 145 mmol/L    Potassium 4.5 3.4 - 5.3 mmol/L    Chloride 105 98 - 107 mmol/L    Carbon Dioxide (CO2) 24 22 - 29 mmol/L    Anion Gap 11 7 - 15 mmol/L    Urea Nitrogen 19.0 8.0 - 23.0 mg/dL    Creatinine 1.14 0.67 - 1.17 mg/dL    Calcium 9.1 8.2 - 9.6 mg/dL    Glucose 104 (H) 70 - 99 mg/dL    GFR Estimate 58 (L) >60 mL/min/1.73m2   Troponin T, High Sensitivity     Status: Abnormal    Collection Time: 10/10/22 12:53 PM   Result Value Ref Range    Troponin T, High Sensitivity 30 (H) <=22 ng/L   CBC with platelets and differential     Status: Abnormal    Collection Time: 10/10/22 12:53 PM   Result Value Ref Range    WBC Count 8.2 4.0 - " 11.0 10e3/uL    RBC Count 4.02 (L) 4.40 - 5.90 10e6/uL    Hemoglobin 12.3 (L) 13.3 - 17.7 g/dL    Hematocrit 39.1 (L) 40.0 - 53.0 %    MCV 97 78 - 100 fL    MCH 30.6 26.5 - 33.0 pg    MCHC 31.5 31.5 - 36.5 g/dL    RDW 13.2 10.0 - 15.0 %    Platelet Count 185 150 - 450 10e3/uL    % Neutrophils 66 %    % Lymphocytes 24 %    % Monocytes 8 %    % Eosinophils 1 %    % Basophils 1 %    % Immature Granulocytes 0 %    NRBCs per 100 WBC 0 <1 /100    Absolute Neutrophils 5.4 1.6 - 8.3 10e3/uL    Absolute Lymphocytes 1.9 0.8 - 5.3 10e3/uL    Absolute Monocytes 0.7 0.0 - 1.3 10e3/uL    Absolute Eosinophils 0.1 0.0 - 0.7 10e3/uL    Absolute Basophils 0.0 0.0 - 0.2 10e3/uL    Absolute Immature Granulocytes 0.0 <=0.4 10e3/uL    Absolute NRBCs 0.0 10e3/uL   CT Head w/o Contrast     Status: None (Preliminary result)    Collection Time: 10/10/22  2:52 PM    Narrative    CT SCAN OF THE HEAD WITHOUT CONTRAST   10/10/2022 2:52 PM     HISTORY: Advanced age. Episode of occipital headache and neck ache.  Untreated hypertension. Evaluate for acute intracranial process.    TECHNIQUE:  Axial images of the head and coronal reformations without  IV contrast material. Radiation dose for this scan was reduced using  automated exposure control, adjustment of the mA and/or kV according  to patient size, or iterative reconstruction technique.    COMPARISON: CT head dated 4/7/2021.    FINDINGS: No acute intracranial hemorrhage is identified. No  extra-axial fluid collection or mass effect/herniation. No definite CT  findings of acute infarct. Unchanged small calcified extra-axial mass  measuring approximately 6-7 mm along the right anterior aspect of the  falx cerebri, nonspecific, but concerning for a possible neoplasm such  as a meningioma. No significant associated mass effect.    The ventricles are normal in size, shape and configuration. Mild to  moderate generalized brain parenchymal volume loss. Mild patchy  periventricular white matter  hypodensities which are nonspecific, but  likely related to chronic microvascular ischemic disease.     The visualized aspects of the paranasal sinuses and mastoids are  clear. There is presumed cerumen in the bilateral external auditory  canals. Degenerative changes of the bilateral temporomandibular joints  are partially visualized. The bony calvarium and bones of the skull  base appear intact.       Impression    IMPRESSION:  1. No CT findings of acute intracranial process.  2. Generalized brain atrophy and presumed chronic small vessel  ischemic changes, as described. This appears similar to the prior  examination.  3. Unchanged small extra-axial calcified lesion along the right  anterior aspect of the falx cerebri, which could represent a calcified  meningioma. No associated mass effect on the underlying brain  parenchyma.   CTA Head Neck with Contrast     Status: None (Preliminary result)    Collection Time: 10/10/22  2:53 PM    Narrative    CT ANGIOGRAM OF THE HEAD AND NECK WITH CONTRAST  10/10/2022 2:53 PM     HISTORY: Advanced age. Episode of occipital headache and neck ache.  Untreated hypertension. Evaluate for acute vascular process.     TECHNIQUE:  CT angiography with an injection of 68 mL Isovue-370 IV  with scans through the head and neck. Images were transferred to a  separate 3-D workstation where multiplanar reformations and 3-D images  were created. Estimates of carotid stenoses are made relative to the  distal internal carotid artery diameters except as noted. Radiation  dose for this scan was reduced using automated exposure control,  adjustment of the mA and/or kV according to patient size, or iterative  reconstruction technique.    COMPARISON: CT head of same day.       CT ANGIOGRAM HEAD FINDINGS: There is diffuse asymmetric mild narrowing  of the intracranial left internal carotid artery. There is  superimposed atherosclerotic disease involving the carotid siphons,  contributing to at least  moderate focal stenosis of the supraclinoid  segment of the left internal carotid artery (series 6 image 394). Mild  stenosis seen elsewhere involving the intracranial internal carotid  arteries. Mild segmental stenosis involving the proximal M1 segment of  the right MCA (series 8 image 62). No definite high-grade stenosis or  large vessel occlusion involving the major proximal branches of the  anterior cerebral or middle cerebral arteries. There is an  asymmetrically smaller A1 segment of the left anterior cerebral artery  as compared to the right, probably developmental.    The bilateral vertebral arteries are patent. The basilar artery  appears patent. Mild to moderate focal stenosis of the P1-P2 junction  of the right posterior cerebral artery (series 8 image 55). Otherwise,  the proximal branches of the posterior cerebral arteries appear patent  without significant stenosis.      CT ANGIOGRAM NECK FINDINGS:   Common origin of the brachiocephalic and left common carotid arteries,  a normal variant. Mixed atherosclerotic disease of the aortic arch  without significant stenosis at the origins of the great vessels.     Right carotid artery: There is up to moderate focal stenosis of the  mid right common carotid artery (series 6 image 165) with mild luminal  irregularity/narrowing elsewhere. This may be due to atherosclerotic  disease. There is mild atherosclerosis at the carotid bifurcation  extending into the proximal internal carotid artery without  significant stenosis. Otherwise, the cervical internal carotid artery  is patent.    Left carotid artery: Mild scattered nonflow limiting atherosclerosis  of the left common carotid artery is noted. There is mixed  atherosclerotic disease with prominent noncalcified component  involving the left carotid bifurcation and proximal internal carotid  artery, resulting in greater than 90% focal stenosis of the proximal  internal carotid artery by NASCET criteria (series 6  image 263). There  is asymmetric mild to moderate diffuse narrowing of the left cervical  internal carotid artery above the level of the critical stenosis.    Vertebral arteries: There is a mild-to-moderate focal stenosis of the  upper V2 segment of the right vertebral artery. There is mild to  moderate segmental stenosis of the V1 segment of the left vertebral  artery. Otherwise, the bilateral vertebral arteries appear patent  without significant stenosis. No findings to suggest recent  dissection.    Other findings: There is flowing contiguous ossification along the  anterior aspect of the vertebral column bridging the disc spaces from  at least C4 down to T3. There appears to be solid interbody  fusion/ankylosis across the C4-C5, C5-C6 and C6-C7 disc spaces. There  is severe disc space narrowing at C3-C4 with marginal endplate  osteophytes.       Impression    IMPRESSION:   CTA HEAD:  1. Diffuse asymmetric narrowing of the left internal carotid artery  with superimposed atherosclerotic disease contributing to at least  moderate focal stenosis of the supraclinoid left internal carotid  artery.  2. Mild atherosclerosis of the intracranial right internal carotid  artery, without flow-limiting stenosis.  3. Mild/mild to moderate stenoses involving the right middle cerebral  artery M1 segment and right posterior cerebral artery P1-P2 junction,  probably due to atherosclerosis.    CTA NECK:  1. Critical (greater than 90%) focal stenosis of the left proximal  internal carotid artery due to atherosclerotic disease.  2. Moderate focal stenosis of the mid right common carotid artery  likely due to atherosclerosis. Mild atherosclerosis of the right  carotid bifurcation/proximal internal carotid artery without  significant stenosis.  3. Mild to moderate stenoses of the upper V2 segment of the right  vertebral artery and the V1 segment of the left vertebral artery.   Troponin T, High Sensitivity     Status: Abnormal     Collection Time: 10/10/22  3:08 PM   Result Value Ref Range    Troponin T, High Sensitivity 26 (H) <=22 ng/L       ED MEDICATIONS:   Medications   sodium chloride 0.9% infusion (0 mLs Intravenous Stopped 10/10/22 1709)   0.9% sodium chloride BOLUS (0 mLs Intravenous Stopped 10/10/22 1400)   iopamidol (ISOVUE-370) solution 68 mL (68 mLs Intravenous Given 10/10/22 1425)   sodium chloride 0.9 % bag 500 mL for CT scan flush use (100 mLs Intravenous Given 10/10/22 1424)   cloNIDine (CATAPRES) tablet 0.1 mg (0.1 mg Oral Given 10/10/22 1707)         Impression:    ICD-10-CM    1. Hypertension, unspecified type  I10     prior diagnosis and previous treatment. No recent therapy       2. Chest discomfort  R07.89     episode of heaviness with blood pressure check at home      3. Carotid stenosis, asymptomatic, left  I65.22           Plan:    Starting 2.5 mg amlodipine for high blood pressure.  81 mg aspirin 40 mg atorvastatin for medical management of severe left carotid stenosis.      MD Brien Szymanski Richard J, MD  10/10/22 5858

## 2022-10-10 NOTE — ED NOTES
Pt's BP continues to remain elevated. MD aware: clonidine ordered to give prior to discharge, and pt to be sent home with prescriptions for BP.

## 2022-10-10 NOTE — ED NOTES
Prescription for amlodipine and atorvastatin sent to pharmacy. Pt and wife updated. Pt and wife reminded to make follow-up appointment with PCP to monitor BP.

## 2022-10-10 NOTE — ED NOTES
MD notified of continued elevated BP's (most current 196 systolic). Now new orders; pt asymptomatic. Staff continuing to monitor.

## 2022-10-10 NOTE — ED NOTES
Pt states his wife was checking her BP at home when he decided to check his. His was elevated so he went to the clinic to get it checked out. The clinic sent him here. Pt denied any chest pain. States he had a little chest heaviness earlier which is gone. States he was dizzy, but that has been on and off for some time now. Gupta catheter appears to be draining properly. Pt states he emptied it prior to coming in at about 9:45 and there was 100 ml of urine. Denied any discomforts. Pt is not on any medications.

## 2022-10-10 NOTE — ED TRIAGE NOTES
Blood pressure elevated-not on medication-pt was seen at clinic and advised to present to ED. Pt also reports chest heaviness that radiates to back. Pt c/o dizziness.     Triage Assessment     Row Name 10/10/22 1128       Triage Assessment (Adult)    Airway WDL WDL       Respiratory WDL    Respiratory WDL WDL       Skin Circulation/Temperature WDL    Skin Circulation/Temperature WDL WDL       Cardiac WDL    Cardiac WDL X;chest pain       Chest Pain Assessment    Chest Pain Location midsternal    Chest Pain Radiation back    Character other (see comments);pressure  heaviness    Associated Signs/Symptoms dizziness;hypertension       Peripheral/Neurovascular WDL    Peripheral Neurovascular WDL WDL       Cognitive/Neuro/Behavioral WDL    Cognitive/Neuro/Behavioral WDL WDL

## 2022-10-10 NOTE — ED PROVIDER NOTES
History     Chief Complaint   Patient presents with     Hypertension     Blood pressure elevated-not on medication-pt was seen at clinic and advised to present to ED.      HOLDEN Esposito is a 89 year old male who presents with concern about his blood pressure and that he is out of his prescribed antihypertensives.    Patient's medical records show prior diagnosis of hypertension, history of urinary retention, low back pain, and a prior history of bladder cancer.  Is also been diagnosed with BPH.    Patient was prescribed medications were reviewed.  Patient is on Proscar 5 mg daily.    On examination patient was accompanied by his wife Anali.  Patient tells me that yesterday he checked his blood pressure using his wife's home monitor and it was in the 200s.  He reports he had chest heaviness and that he checked it serially over 4 times and his pressure remained elevated in the 200s.  Patient tells me he had been treated for hypertension several years ago before returning in the 1980s to keep his blood pressure below 160 while [place] [employer].  He reports he was over the .  He does not smoke or drink alcohol.  He also had an episode of aching around the neck and the base of the skull yesterday.  He went to the clinic today for evaluation and his blood pressure was captured to be 180/90 and was recommended he come to the emergency department to be evaluated.  Patient tells me he is not been on any medication for his blood pressure for over 10 years.  On arrival he has no chest heaviness he has no headache no neck pain and there was no episode of speech difficulty extremity weakness or facial numbness and no blurry vision.    Allergies:  No Known Allergies    Problem List:    Patient Active Problem List    Diagnosis Date Noted     Cataract 01/12/2021     Priority: Medium     Formatting of this note might be different from the original.  Created by Conversion  Formatting of this note  might be different from the original.  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     Priority: Medium     Hematuria 09/29/2020     Priority: Medium     Sepsis (H) 08/14/2020     Priority: Medium     Benign prostatic hyperplasia with lower urinary tract symptoms 05/13/2019     Priority: Medium     Difficulty in walking, not elsewhere classified 05/13/2019     Priority: Medium     Fluid overload, unspecified 05/13/2019     Priority: Medium     Gastro-esophageal reflux disease without esophagitis 05/13/2019     Priority: Medium     Low back pain 05/13/2019     Priority: Medium     Muscle weakness (generalized) 05/13/2019     Priority: Medium     Other abnormal glucose 05/13/2019     Priority: Medium     Formatting of this note might be different from the original.  Created by Conversion       Other hyperlipidemia 05/13/2019     Priority: Medium     Formatting of this note might be different from the original.  Created by Conversion       Pain in unspecified hip 05/13/2019     Priority: Medium     Retention of urine, unspecified 05/13/2019     Priority: Medium     Sinus bradycardia 05/13/2019     Priority: Medium     Unspecified glaucoma 05/13/2019     Priority: Medium     Formatting of this note might be different from the original.  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) 03/04/2016     Priority: Medium        Past Medical History:    Past Medical History:   Diagnosis Date     Arthritis      Benign bladder tumor      GERD (gastroesophageal reflux disease)      Glaucoma      Hemorrhoids      Hyperlipidemia      Hypertension      Prostatitis      Urgency of urination        Past Surgical History:    Past  Surgical History:   Procedure Laterality Date     BLADDER SURGERY      removal of benign tumor     CYSTOSCOPY N/A 9/29/2020    Procedure: CYSTOURETHROSCOPY WITH FULGURATION RESECTION OF BLADDER TUMOR;  Surgeon: Alejandro Mcdonough MD;  Location: [facility];  Service: Urology     CYSTOURETHROSCOPY  09/29/2020     EYE SURGERY Bilateral     Cataract     HEMORRHOIDECTOMY INTERNAL LIGATION      Description: Hemorrhoidectomy;  Recorded: 04/09/2010;       Family History:    Family History   Problem Relation Age of Onset     Diabetes Mother        Social History:  Marital Status:   [2]  Social History     Tobacco Use     Smoking status: Former     Smokeless tobacco: Never     Tobacco comments:     quit smoking in 1983   Substance Use Topics     Alcohol use: No     Drug use: No        Medications:    Acetaminophen (TYLENOL PO)  brimonidine (ALPHAGAN-P) 0.15 % ophthalmic solution  dorzolamide-timolol PF (COSOPT) 22.3-6.8 MG/ML opthalmic solution  finasteride (PROSCAR) 5 MG tablet  latanoprost (XALATAN) 0.005 % ophthalmic solution  loperamide (IMODIUM) 2 MG capsule  menthol-zinc oxide (CALMOSEPTINE) 0.44-20.6 % OINT ointment  nitroFURantoin macrocrystal-monohydrate (MACROBID) 100 MG capsule  penicillin V (VEETID) 500 MG tablet          Review of Systems   Constitutional: Negative.    HENT: Negative.    Eyes: Negative.    Respiratory: Positive for chest tightness (yesterday while checking blood pressure using spouse's device).    Cardiovascular: Negative.    Gastrointestinal: Negative.    Endocrine: Negative.    Genitourinary: Negative.    Musculoskeletal: Positive for neck pain (episode of neck ache a day prior).   Skin: Negative.    Allergic/Immunologic: Negative.    Neurological: Negative.    Hematological: Negative.    Psychiatric/Behavioral: Negative.    All other systems reviewed and are negative.      Physical Exam   BP: (!) 194/77  Pulse: 66  Temp: 97.2  F (36.2  C)  Resp: 18  Height: 180.3 cm (5'  "11\")  Weight: 65.8 kg (145 lb)  SpO2: 100 %      Physical Exam  Constitutional:       General: He is not in acute distress.     Appearance: Normal appearance. He is not ill-appearing, toxic-appearing or diaphoretic.   HENT:      Head: Normocephalic and atraumatic.      Right Ear: Tympanic membrane normal.      Nose: Nose normal.      Mouth/Throat:      Mouth: Mucous membranes are moist.   Eyes:      Extraocular Movements: Extraocular movements intact.      Pupils: Pupils are equal, round, and reactive to light.   Neck:      Vascular: No carotid bruit.   Cardiovascular:      Rate and Rhythm: Normal rate and regular rhythm.      Pulses: Normal pulses.   Pulmonary:      Effort: Pulmonary effort is normal. No respiratory distress.      Breath sounds: Normal breath sounds. No stridor. No wheezing, rhonchi or rales.   Chest:      Chest wall: No tenderness.   Musculoskeletal:         General: No swelling, tenderness, deformity or signs of injury.      Cervical back: Normal range of motion and neck supple. No rigidity or tenderness.      Right lower leg: No edema.      Left lower leg: No edema.   Lymphadenopathy:      Cervical: No cervical adenopathy.   Skin:     Capillary Refill: Capillary refill takes less than 2 seconds.      Coloration: Skin is not jaundiced or pale.      Findings: No bruising, erythema, lesion or rash.   Neurological:      General: No focal deficit present.      Mental Status: He is alert and oriented to person, place, and time.      GCS: GCS eye subscore is 4. GCS verbal subscore is 5. GCS motor subscore is 6.      Cranial Nerves: No cranial nerve deficit.      Sensory: No sensory deficit.      Motor: No tremor, atrophy, abnormal muscle tone, seizure activity or pronator drift.      Coordination: Coordination is intact. Coordination normal.      Gait: Gait normal.      Deep Tendon Reflexes: Reflexes normal.   Psychiatric:         Mood and Affect: Mood normal.         Behavior: Behavior normal.         " Thought Content: Thought content normal.         Judgment: Judgment normal.         ED Course               EKG Interpretation:      Interpreted by Jose Rizo MD  Time reviewed: interpretation addition on 10/18/22 after coding feedback  Symptoms at time of EKG: None   Rhythm: Normal sinus   Rate: Normal  Axis: Normal  Ectopy: None  Conduction: Normal  ST Segments/ T Waves: Non-specific ST-T wave changes  Q Waves: Nonspecific  Comparison to prior: When compared with EKG dated 4/7/21- PVC's have resolved with non-specific ST changes     Clinical Impression: no acute ischemia.         Procedures              Critical Care time:  none               ED medications:  Medications   sodium chloride 0.9% infusion (1,000 mLs Intravenous New Bag 10/10/22 1256)   0.9% sodium chloride BOLUS (500 mLs Intravenous New Bag 10/10/22 1254)   iopamidol (ISOVUE-370) solution 68 mL (68 mLs Intravenous Given 10/10/22 1425)   sodium chloride 0.9 % bag 500 mL for CT scan flush use (100 mLs Intravenous Given 10/10/22 1424)         ED vitals:  Vitals:    10/10/22 1147 10/10/22 1200 10/10/22 1230 10/10/22 1250   BP: (!) 203/79 (!) 181/73 (!) 182/74 (!) 177/72   Pulse: 59 57 60    Resp:       Temp:       TempSrc:       SpO2: 99% 99% 99% 99%   Weight:       Height:         ED labs and imaging:   Results for orders placed or performed during the hospital encounter of 10/10/22   Basic metabolic panel     Status: Abnormal   Result Value Ref Range    Sodium 140 136 - 145 mmol/L    Potassium 4.5 3.4 - 5.3 mmol/L    Chloride 105 98 - 107 mmol/L    Carbon Dioxide (CO2) 24 22 - 29 mmol/L    Anion Gap 11 7 - 15 mmol/L    Urea Nitrogen 19.0 8.0 - 23.0 mg/dL    Creatinine 1.14 0.67 - 1.17 mg/dL    Calcium 9.1 8.2 - 9.6 mg/dL    Glucose 104 (H) 70 - 99 mg/dL    GFR Estimate 58 (L) >60 mL/min/1.73m2   Troponin T, High Sensitivity     Status: Abnormal   Result Value Ref Range    Troponin T, High Sensitivity 30 (H) <=22 ng/L   CBC with platelets  and differential     Status: Abnormal   Result Value Ref Range    WBC Count 8.2 4.0 - 11.0 10e3/uL    RBC Count 4.02 (L) 4.40 - 5.90 10e6/uL    Hemoglobin 12.3 (L) 13.3 - 17.7 g/dL    Hematocrit 39.1 (L) 40.0 - 53.0 %    MCV 97 78 - 100 fL    MCH 30.6 26.5 - 33.0 pg    MCHC 31.5 31.5 - 36.5 g/dL    RDW 13.2 10.0 - 15.0 %    Platelet Count 185 150 - 450 10e3/uL    % Neutrophils 66 %    % Lymphocytes 24 %    % Monocytes 8 %    % Eosinophils 1 %    % Basophils 1 %    % Immature Granulocytes 0 %    NRBCs per 100 WBC 0 <1 /100    Absolute Neutrophils 5.4 1.6 - 8.3 10e3/uL    Absolute Lymphocytes 1.9 0.8 - 5.3 10e3/uL    Absolute Monocytes 0.7 0.0 - 1.3 10e3/uL    Absolute Eosinophils 0.1 0.0 - 0.7 10e3/uL    Absolute Basophils 0.0 0.0 - 0.2 10e3/uL    Absolute Immature Granulocytes 0.0 <=0.4 10e3/uL    Absolute NRBCs 0.0 10e3/uL   CBC with platelets differential     Status: Abnormal    Narrative    The following orders were created for panel order CBC with platelets differential.  Procedure                               Abnormality         Status                     ---------                               -----------         ------                     CBC with platelets and d...[[id number]]  Abnormal            Final result                 Please view results for these tests on the individual orders.       Assessments & Plan (with Medical Decision Making)   Assessment Summary and Clinical Impression: 89-year-old male who was referred for further evaluation for hypertension and possible medication refill.  Patient was accompanied by his wife Anali reporting that yesterday he felt chest heaviness and neck ache after checking his blood pressure after using his wife's device.  His blood pressure was in the 200s during the course of the day yesterday.  He had been previously treated for hypertension but has not been on any medication therapy for over 10 years.  In the clinic his blood pressure was noted to be 180/80.  Patient  was advised to present to the emergency department for further assessment and care.  On exam he reports no headache, no chest pain or tightness no neck pain.  He has no complaints.  His blood pressure my exam was 187/92.  We agreed to work-up given untreated hypertension and his advanced age to exclude any neurological cardiac emergency.  He did not exhibit any symptoms that would suggest hypertensive emergency.    At shift end patient was awaiting neuroimaging with CT and CTA of the head and neck and follow-up troponin given mild elevation on arrival with plan to consider discharge if normal neuroimaging and no significant delta troponin with outpatient follow-up with therapy for hypertension and close outpatient follow-up.  If abnormal findings on CT and CT or significant delta troponin further intervention follow-up care during ED course may be required.    ED course and plan  Reviewed the medical record.  Reviewed visiting family medicine prior to ED arrival.  Patient's blood pressure remained elevated although improving without therapies during his ED course.  A work-up was undertaken due to advanced age and symptoms reported although a day prior.  Patient was asymptomatic on exam and his blood pressure was monitored serially.  We discussed possible causes for symptoms reported and his elevated blood pressure readings captured at home prior to arrival in clinic    At shift end at 2.30pm patient signed out to Dr. KRISTIE Tesfaye. Patient is  awaiting head CT and CT of the head and neck.  Patient's arrival troponin was mildly elevated at 38 points above the upper limits of normal.  Plan is to trend troponin 2-hour delta pending.  If significant findings on neuroimaging or significant delta further consultation and follow-up care will be required.  Handoff and plan of care reviewed with the patient and spouse at the bedside.  Consider amlodipine 2.5 mg daily for blood pressure management and primary care follow-up if  negative work-up     ADDENDUM-  Addition of EKG interpretation, coding feedback on 10//18/22.         Disclaimer: This note consists of symbols derived from keyboarding, dictation and/or voice recognition software. As a result, there may be errors in the script that have gone undetected. Please consider this when interpreting information found in this chart.  I have reviewed the nursing notes.    I have reviewed the findings, diagnosis, plan and need for follow up with the patient.       New Prescriptions    No medications on file       Final diagnoses:   Hypertension, unspecified type - prior diagnosis and previous treatment. No recent therapy    Chest discomfort - episode of heaviness with blood pressure check at home       10/10/2022   [facility] EMERGENCY DEPT     Jose Rizo MD  10/10/22 5906       Jose Rizo MD  10/18/22 3423

## 2022-10-10 NOTE — DISCHARGE INSTRUCTIONS
I would like you to take a low dose aspirin daily. This is 81 mg and also known as a baby aspirin. I would also like you to start taking amlodipine for your high blood pressure and atorvastatin to reduce your risk of stroke.     You will need to follow up with your primary provider to monitor your blood pressure control. You can also have further conversations about possible surgical intervention at that time.     If your symptoms worsen or you develop new or concerning symptoms, please return to the Emergency Department for further evaluation and treatment.

## 2022-10-25 ENCOUNTER — OFFICE VISIT (OUTPATIENT)
Dept: UROLOGY | Facility: CLINIC | Age: 87
End: 2022-10-25
Payer: COMMERCIAL

## 2022-10-25 VITALS
HEIGHT: 71 IN | WEIGHT: 145.06 LBS | BODY MASS INDEX: 20.31 KG/M2 | HEART RATE: 75 BPM | DIASTOLIC BLOOD PRESSURE: 62 MMHG | SYSTOLIC BLOOD PRESSURE: 182 MMHG

## 2022-10-25 DIAGNOSIS — R33.9 URINARY RETENTION: Primary | ICD-10-CM

## 2022-10-25 DIAGNOSIS — C67.2 MALIGNANT NEOPLASM OF LATERAL WALL OF URINARY BLADDER (H): ICD-10-CM

## 2022-10-25 LAB
ALBUMIN UR-MCNC: NEGATIVE MG/DL
APPEARANCE UR: ABNORMAL
BACTERIA #/AREA URNS HPF: ABNORMAL /HPF
BILIRUB UR QL STRIP: NEGATIVE
COLOR UR AUTO: YELLOW
GLUCOSE UR STRIP-MCNC: NEGATIVE MG/DL
HGB UR QL STRIP: ABNORMAL
KETONES UR STRIP-MCNC: NEGATIVE MG/DL
LEUKOCYTE ESTERASE UR QL STRIP: ABNORMAL
NITRATE UR QL: NEGATIVE
PH UR STRIP: 5 [PH] (ref 5–7)
RBC #/AREA URNS AUTO: ABNORMAL /HPF
SP GR UR STRIP: 1.01 (ref 1–1.03)
SQUAMOUS #/AREA URNS AUTO: ABNORMAL /LPF
UROBILINOGEN UR STRIP-ACNC: 0.2 E.U./DL
WBC #/AREA URNS AUTO: ABNORMAL /HPF
WBC CLUMPS #/AREA URNS HPF: PRESENT /HPF

## 2022-10-25 PROCEDURE — 87088 URINE BACTERIA CULTURE: CPT | Performed by: UROLOGY

## 2022-10-25 PROCEDURE — 52000 CYSTOURETHROSCOPY: CPT | Performed by: UROLOGY

## 2022-10-25 PROCEDURE — 87086 URINE CULTURE/COLONY COUNT: CPT | Performed by: UROLOGY

## 2022-10-25 PROCEDURE — 99212 OFFICE O/P EST SF 10 MIN: CPT | Mod: 25 | Performed by: UROLOGY

## 2022-10-25 PROCEDURE — 87186 SC STD MICRODIL/AGAR DIL: CPT | Performed by: UROLOGY

## 2022-10-25 PROCEDURE — 88112 CYTOPATH CELL ENHANCE TECH: CPT | Performed by: PATHOLOGY

## 2022-10-25 PROCEDURE — 81001 URINALYSIS AUTO W/SCOPE: CPT | Performed by: UROLOGY

## 2022-10-25 NOTE — NURSING NOTE
"Initial BP (!) 182/62 (BP Location: Right arm, Patient Position: Sitting, Cuff Size: Adult Regular)   Pulse 75   Ht 1.803 m (5' 10.98\")   Wt 65.8 kg (145 lb 1 oz)   BMI 20.24 kg/m   Estimated body mass index is 20.24 kg/m  as calculated from the following:    Height as of this encounter: 1.803 m (5' 10.98\").    Weight as of this encounter: 65.8 kg (145 lb 1 oz). .    Jodi Sanchez MA    "

## 2022-10-26 NOTE — PROGRESS NOTES
Appointment source: Established Patient  Patient name: Gabriel Esposito  Urology Staff: James Billy MD    Subjective: This is a 89 year old year old male returning for follow up of urinary retention and history of recurrent bladder cancer (2009? diagnosis mixed low and high grade) with BCG therapy.    Objective:  Cystoscopy was performed today. Bladder appeared normal other than areas of inflammation presumably secondary to chronic bladder catheterization for management of urinary retention.    The retention has been present for at least 5 years.    He remains on finasteride and occasional voiding trials have been undertaken.    Assessment:  No compelling evidence of bladder cancer recurrence although surveillance is challenged by the presence of bladder wall inflammation secondary to the chronic bladder catheter drainage for management of urinary retention.    Plan:  Will suspend additional cystoscopy unless gross hematuria develops.    Voiding trials may be attempted in the future but his wife is doubtful that resumption of spontaneous urinary this will be practical.    Total time 15 minutes

## 2022-10-29 NOTE — PROGRESS NOTES
Recent cystoscopy revealed diffuse cystitis.  Urine was also overtly purulent.    Will give a brief course of Macrobid to reduce the bacteria.

## 2022-11-11 NOTE — TELEPHONE ENCOUNTER
[facility] [employer] is calling to see if [facility]  got their fax, requesting 3 different medical supplies for pt.  It was sent to them on 10/28 and 11/9.  I verified fax # for Dr Billy's clinic and they will resend it to the [facility] (DR Billy)

## 2022-11-15 NOTE — TELEPHONE ENCOUNTER
[facility] [employer] is calling, to see if three different supply orders have been signed. Matilda Houser were given orders today to be signed.  Thank you,  Lilian Sotelo - [facility]

## 2023-01-01 ENCOUNTER — OFFICE VISIT (OUTPATIENT)
Dept: UROLOGY | Facility: CLINIC | Age: 88
End: 2023-01-01
Payer: COMMERCIAL

## 2023-01-01 ENCOUNTER — TELEPHONE (OUTPATIENT)
Dept: UROLOGY | Facility: CLINIC | Age: 88
End: 2023-01-01
Payer: COMMERCIAL

## 2023-01-01 VITALS — OXYGEN SATURATION: 99 % | HEART RATE: 71 BPM | SYSTOLIC BLOOD PRESSURE: 184 MMHG | DIASTOLIC BLOOD PRESSURE: 71 MMHG

## 2023-01-01 DIAGNOSIS — N30.00 ACUTE CYSTITIS WITHOUT HEMATURIA: Primary | ICD-10-CM

## 2023-01-01 DIAGNOSIS — N40.1 BENIGN PROSTATIC HYPERPLASIA WITH URINARY OBSTRUCTION: ICD-10-CM

## 2023-01-01 DIAGNOSIS — R33.9 URINARY RETENTION: Primary | ICD-10-CM

## 2023-01-01 DIAGNOSIS — N13.8 BENIGN PROSTATIC HYPERPLASIA WITH URINARY OBSTRUCTION: ICD-10-CM

## 2023-01-01 LAB
ALBUMIN UR-MCNC: NEGATIVE MG/DL
APPEARANCE UR: ABNORMAL
BACTERIA #/AREA URNS HPF: ABNORMAL /HPF
BACTERIA UR CULT: ABNORMAL
BACTERIA UR CULT: ABNORMAL
BILIRUB UR QL STRIP: NEGATIVE
COLOR UR AUTO: YELLOW
GLUCOSE UR STRIP-MCNC: NEGATIVE MG/DL
HGB UR QL STRIP: ABNORMAL
KETONES UR STRIP-MCNC: NEGATIVE MG/DL
LEUKOCYTE ESTERASE UR QL STRIP: ABNORMAL
MUCOUS THREADS #/AREA URNS LPF: PRESENT /LPF
NITRATE UR QL: POSITIVE
PH UR STRIP: 6 [PH] (ref 5–7)
RBC #/AREA URNS AUTO: ABNORMAL /HPF
SP GR UR STRIP: <=1.005 (ref 1–1.03)
SQUAMOUS #/AREA URNS AUTO: ABNORMAL /LPF
UROBILINOGEN UR STRIP-ACNC: 0.2 E.U./DL
WBC #/AREA URNS AUTO: ABNORMAL /HPF

## 2023-01-01 PROCEDURE — 87088 URINE BACTERIA CULTURE: CPT | Performed by: STUDENT IN AN ORGANIZED HEALTH CARE EDUCATION/TRAINING PROGRAM

## 2023-01-01 PROCEDURE — 51702 INSERT TEMP BLADDER CATH: CPT | Performed by: STUDENT IN AN ORGANIZED HEALTH CARE EDUCATION/TRAINING PROGRAM

## 2023-01-01 PROCEDURE — 81001 URINALYSIS AUTO W/SCOPE: CPT | Performed by: STUDENT IN AN ORGANIZED HEALTH CARE EDUCATION/TRAINING PROGRAM

## 2023-01-01 PROCEDURE — 87186 SC STD MICRODIL/AGAR DIL: CPT | Performed by: STUDENT IN AN ORGANIZED HEALTH CARE EDUCATION/TRAINING PROGRAM

## 2023-01-01 PROCEDURE — 99214 OFFICE O/P EST MOD 30 MIN: CPT | Mod: 25 | Performed by: STUDENT IN AN ORGANIZED HEALTH CARE EDUCATION/TRAINING PROGRAM

## 2023-01-01 PROCEDURE — 87086 URINE CULTURE/COLONY COUNT: CPT | Performed by: STUDENT IN AN ORGANIZED HEALTH CARE EDUCATION/TRAINING PROGRAM

## 2023-01-01 RX ORDER — FINASTERIDE 5 MG/1
5 TABLET, FILM COATED ORAL DAILY
Qty: 90 TABLET | Refills: 3 | Status: SHIPPED | OUTPATIENT
Start: 2023-01-01

## 2023-01-01 RX ORDER — AMLODIPINE BESYLATE 2.5 MG/1
TABLET ORAL
COMMUNITY

## 2023-01-01 RX ORDER — ATORVASTATIN CALCIUM 40 MG/1
1 TABLET, FILM COATED ORAL DAILY
COMMUNITY
Start: 2022-01-01

## 2023-01-01 RX ORDER — SULFAMETHOXAZOLE/TRIMETHOPRIM 800-160 MG
1 TABLET ORAL 2 TIMES DAILY
Qty: 14 TABLET | Refills: 0 | Status: SHIPPED | OUTPATIENT
Start: 2023-01-01 | End: 2023-01-01

## 2023-01-01 RX ORDER — CIPROFLOXACIN 250 MG/1
TABLET, FILM COATED ORAL
Qty: 30 TABLET | Refills: 0 | Status: SHIPPED | OUTPATIENT
Start: 2023-01-01

## 2023-04-19 NOTE — PROGRESS NOTES
UROLOGY FOLLOW-UP NOTE          Chief Complaint:   Today I had the pleasure of seeing Mr. Gabriel Esposito in follow-up for a chief complaint of urinary retention.          Interval Update   Gabriel Esposito is a very pleasant 89 year old male with a history of bladder cancer, HLD, HTN, and glaucoma.     Brief History: Mr. Gabriel Esposito has followed with Dr. Billy for urination retention currently managed by an indwelling catheter. He currently takes finasteride 5 mg daily.     Today's notes: The patient notes he gets a UTI about five days after every catheter change. He reports pain with urine passage through the catheter and occasional gross hematuria.          Physical Exam:   Patient is a 89 year old  male   Vitals: Blood pressure (!) 184/71, pulse 71, SpO2 99 %.  General: Alert and oriented x 3, no acute distress.  Respiratory: Non-labored breathing.  Cardiac: Regular rate.      Labs and Pathology:    I personally reviewed all applicable laboratory data and went over findings with patient  Significant for:    CBC RESULTS:  Recent Labs   Lab Test 10/10/22  1253 07/06/22  1456 03/10/22  1039 04/07/21  1710   WBC 8.2 9.4 9.4 8.4   HGB 12.3* 12.3* 14.3 13.8    235 234 218        BMP RESULTS:  Recent Labs   Lab Test 10/10/22  1253 07/06/22  1456 03/10/22  1039 04/07/21  1710 11/07/20  1424 09/30/20  2357 09/30/20  0630 09/29/20  1710    138 144 140 142  --  139 141   POTASSIUM 4.5 3.9 3.9 3.7 3.8  --  4.3 4.5   CHLORIDE 105 106 111* 107 110*  --  105 105   CO2 24 27 27 25 28  --  27 25   ANIONGAP 11 5 6 8 4  --  7 11   * 109* 135* 127* 100*   < > 114 139*   BUN 19.0 16 21 25 17  --  20 18   CR 1.14 0.97 1.14 1.11 1.03  --  0.96 0.99   GFRESTIMATED 58* 71 58* 55* 61  --  >60 >60   GFRESTBLACK  --   --   --  64 70  --  >60 >60   GREGORIO 9.1 8.5 9.1 8.9 9.0  --  8.6 8.9    < > = values in this interval not displayed.       UA RESULTS:   Recent Labs   Lab Test 10/25/22  1622 07/06/22  1330 05/24/22  1400    SG 1.015 1.010 1.020   URINEPH 5.0 5.0 6.0   NITRITE Negative Negative Negative   RBCU 25-50* 112* 10-25*   WBCU 10-25* >182* >100*            Assessment/Plan   89 year old male seen in evaluation for urinary retention managed by an indwelling catheter. The patient notes he gets a UTI about five days after every catheter change. He reports pain with urine passage through the catheter and occasional gross hematuria.     We discussed starting with an antibiotic taken the day of and the day following catheter change to prevent a UTI. If this is not successful, could consider daily antibiotic prophylaxis or methenamine.     Plan:  1. Ciprofloxacin 250 mg the day of and the day following catheter exchange.   2. UA and urine culture today.   3. Patient will call clinic if he develops UTI symptoms.            Past Medical History:     Past Medical History:   Diagnosis Date     Arthritis      Benign bladder tumor      GERD (gastroesophageal reflux disease)      Glaucoma      Hemorrhoids      Hyperlipidemia      Hypertension      Prostatitis      Urgency of urination             Past Surgical History:     Past Surgical History:   Procedure Laterality Date     BLADDER SURGERY      removal of benign tumor     CYSTOSCOPY N/A 9/29/2020    Procedure: CYSTOURETHROSCOPY WITH FULGURATION RESECTION OF BLADDER TUMOR;  Surgeon: Alejandro Mcdonough MD;  Location: [facility] [place];  Service: Urology     CYSTOURETHROSCOPY  09/29/2020     EYE SURGERY Bilateral     Cataract     HEMORRHOIDECTOMY INTERNAL LIGATION      Description: Hemorrhoidectomy;  Recorded: 04/09/2010;            Medications     Current Outpatient Medications   Medication     amLODIPine (NORVASC) 2.5 MG tablet     atorvastatin (LIPITOR) 40 MG tablet     finasteride (PROSCAR) 5 MG tablet     Acetaminophen (TYLENOL PO)     amLODIPine (NORVASC) 2.5 MG tablet     atorvastatin (LIPITOR) 40 MG tablet     brimonidine (ALPHAGAN-P) 0.15 % ophthalmic solution      dorzolamide-timolol PF (COSOPT) 22.3-6.8 MG/ML opthalmic solution     latanoprost (XALATAN) 0.005 % ophthalmic solution     loperamide (IMODIUM) 2 MG capsule     No current facility-administered medications for this visit.            Family History:     Family History   Problem Relation Age of Onset     Diabetes Mother             Social History:     Social History     Socioeconomic History     Marital status:      Spouse name: Not on file     Number of children: Not on file     Years of education: Not on file     Highest education level: Not on file   Occupational History     Not on file   Tobacco Use     Smoking status: Former     Smokeless tobacco: Never     Tobacco comments:     quit smoking in 1983   Vaping Use     Vaping status: Not on file   Substance and Sexual Activity     Alcohol use: No     Drug use: No     Sexual activity: Not on file   Other Topics Concern     Not on file   Social History Narrative     Not on file     Social Determinants of Health     Financial Resource Strain: Not on file   Food Insecurity: Not on file   Transportation Needs: Not on file   Physical Activity: Not on file   Stress: Not on file   Social Connections: Not on file   Intimate Partner Violence: Not on file   Housing Stability: Not on file            Allergies:   Patient has no known allergies.         Review of Systems:  From intake questionnaire   Negative 14 system review except as noted on HPI, nurse's note.        DONNA FRAUSTO PA-C  Department of Urology

## 2023-04-19 NOTE — NURSING NOTE
Catheter removal documentation on 4/19/2023:  Gabriel Esposito presents to the clinic for catheter removal.  Reason for removal: replacement  Catheter successfully removed at 1:00 PM without immediate complication.  Urine in golden bag appearing light yellow without sediment     Catheter insertion documentation on 4/19/2023:  Catheter successfully inserted into the urethral meatus in the usual sterile fashion without immediate complication.  Type of catheter placed: 16 [place] Coude catheter  Urine is yellow in color.  Urine output noted in tubing   Balloon was filled with 10 cc's of normal saline.  Securement device placed for the catheter.  Tubing was attached to a medium leg bag.  Provider on-site for nurse visit: Matilda Houser    The patient tolerated the procedure and was instructed to contact clinic with any questions or concerns.     Sharla Slade RN

## 2023-05-09 NOTE — TELEPHONE ENCOUNTER
Requested Prescriptions   Pending Prescriptions Disp Refills     finasteride (PROSCAR) 5 MG tablet 90 tablet 3     Sig: Take 1 tablet (5 mg) by mouth daily       There is no refill protocol information for this order        Last office visit: 4/19/2023 ; last virtual visit: Visit date not found with prescribing provider:  DONNA FRAUSTO    Future Office Visit:          Brisa Valle  [facility]